# Patient Record
Sex: FEMALE | Race: BLACK OR AFRICAN AMERICAN | NOT HISPANIC OR LATINO | Employment: FULL TIME | URBAN - METROPOLITAN AREA
[De-identification: names, ages, dates, MRNs, and addresses within clinical notes are randomized per-mention and may not be internally consistent; named-entity substitution may affect disease eponyms.]

---

## 2019-12-03 ENCOUNTER — HOSPITAL ENCOUNTER (EMERGENCY)
Facility: HOSPITAL | Age: 29
Discharge: HOME | End: 2019-12-03
Attending: EMERGENCY MEDICINE | Admitting: EMERGENCY MEDICINE

## 2019-12-03 VITALS
RESPIRATION RATE: 20 BRPM | SYSTOLIC BLOOD PRESSURE: 100 MMHG | TEMPERATURE: 97.2 F | OXYGEN SATURATION: 100 % | HEART RATE: 81 BPM | DIASTOLIC BLOOD PRESSURE: 69 MMHG | HEIGHT: 67 IN

## 2019-12-03 DIAGNOSIS — N93.8 DYSFUNCTIONAL UTERINE BLEEDING: Primary | ICD-10-CM

## 2019-12-03 LAB
ANION GAP SERPL CALCULATED.3IONS-SCNC: 8.3 MMOL/L (ref 5–15)
BASOPHILS # BLD AUTO: 0.02 10*3/MM3 (ref 0–0.2)
BASOPHILS NFR BLD AUTO: 0.3 % (ref 0–1.5)
BILIRUB UR QL STRIP: NEGATIVE
BUN BLD-MCNC: 8 MG/DL (ref 6–20)
BUN/CREAT SERPL: 13.1 (ref 7–25)
CALCIUM SPEC-SCNC: 9.1 MG/DL (ref 8.6–10.5)
CHLORIDE SERPL-SCNC: 103 MMOL/L (ref 98–107)
CLARITY UR: CLEAR
CO2 SERPL-SCNC: 28.7 MMOL/L (ref 22–29)
COLOR UR: YELLOW
CREAT BLD-MCNC: 0.61 MG/DL (ref 0.57–1)
DEPRECATED RDW RBC AUTO: 37.6 FL (ref 37–54)
EOSINOPHIL # BLD AUTO: 0.11 10*3/MM3 (ref 0–0.4)
EOSINOPHIL NFR BLD AUTO: 1.8 % (ref 0.3–6.2)
ERYTHROCYTE [DISTWIDTH] IN BLOOD BY AUTOMATED COUNT: 12.4 % (ref 12.3–15.4)
GFR SERPL CREATININE-BSD FRML MDRD: 142 ML/MIN/1.73
GLUCOSE BLD-MCNC: 74 MG/DL (ref 65–99)
GLUCOSE UR STRIP-MCNC: NEGATIVE MG/DL
HCG SERPL QL: NEGATIVE
HCT VFR BLD AUTO: 37 % (ref 34–46.6)
HGB BLD-MCNC: 12.7 G/DL (ref 12–15.9)
HGB UR QL STRIP.AUTO: NEGATIVE
IMM GRANULOCYTES # BLD AUTO: 0.01 10*3/MM3 (ref 0–0.05)
IMM GRANULOCYTES NFR BLD AUTO: 0.2 % (ref 0–0.5)
INR PPP: 1.04 (ref 0.9–1.1)
KETONES UR QL STRIP: NEGATIVE
LEUKOCYTE ESTERASE UR QL STRIP.AUTO: NEGATIVE
LYMPHOCYTES # BLD AUTO: 3 10*3/MM3 (ref 0.7–3.1)
LYMPHOCYTES NFR BLD AUTO: 48.5 % (ref 19.6–45.3)
MCH RBC QN AUTO: 28.8 PG (ref 26.6–33)
MCHC RBC AUTO-ENTMCNC: 34.3 G/DL (ref 31.5–35.7)
MCV RBC AUTO: 83.9 FL (ref 79–97)
MONOCYTES # BLD AUTO: 0.53 10*3/MM3 (ref 0.1–0.9)
MONOCYTES NFR BLD AUTO: 8.6 % (ref 5–12)
NEUTROPHILS # BLD AUTO: 2.51 10*3/MM3 (ref 1.7–7)
NEUTROPHILS NFR BLD AUTO: 40.6 % (ref 42.7–76)
NITRITE UR QL STRIP: NEGATIVE
NRBC BLD AUTO-RTO: 0 /100 WBC (ref 0–0.2)
PH UR STRIP.AUTO: 7.5 [PH] (ref 5–8)
PLATELET # BLD AUTO: 356 10*3/MM3 (ref 140–450)
PMV BLD AUTO: 9.4 FL (ref 6–12)
POTASSIUM BLD-SCNC: 4.4 MMOL/L (ref 3.5–5.2)
PROT UR QL STRIP: NEGATIVE
PROTHROMBIN TIME: 13.3 SECONDS (ref 11.7–14.2)
RBC # BLD AUTO: 4.41 10*6/MM3 (ref 3.77–5.28)
SODIUM BLD-SCNC: 140 MMOL/L (ref 136–145)
SP GR UR STRIP: 1.01 (ref 1–1.03)
UROBILINOGEN UR QL STRIP: NORMAL
WBC NRBC COR # BLD: 6.18 10*3/MM3 (ref 3.4–10.8)

## 2019-12-03 PROCEDURE — 80048 BASIC METABOLIC PNL TOTAL CA: CPT | Performed by: PHYSICIAN ASSISTANT

## 2019-12-03 PROCEDURE — 99283 EMERGENCY DEPT VISIT LOW MDM: CPT

## 2019-12-03 PROCEDURE — 84703 CHORIONIC GONADOTROPIN ASSAY: CPT | Performed by: PHYSICIAN ASSISTANT

## 2019-12-03 PROCEDURE — 85025 COMPLETE CBC W/AUTO DIFF WBC: CPT | Performed by: PHYSICIAN ASSISTANT

## 2019-12-03 PROCEDURE — 85610 PROTHROMBIN TIME: CPT | Performed by: PHYSICIAN ASSISTANT

## 2019-12-03 PROCEDURE — 81003 URINALYSIS AUTO W/O SCOPE: CPT | Performed by: PHYSICIAN ASSISTANT

## 2019-12-03 RX ORDER — IBUPROFEN 800 MG/1
800 TABLET ORAL EVERY 8 HOURS PRN
Qty: 30 TABLET | Refills: 0 | Status: SHIPPED | OUTPATIENT
Start: 2019-12-03 | End: 2020-02-06

## 2019-12-03 NOTE — DISCHARGE INSTRUCTIONS
Stay well-hydrated.  Recheck with your OB/GYN as scheduled or sooner if available.  Return to the ER for bleeding greater than 1 pad per hour for more than 6 hours, severe pain, fever >100.4, passing out, intractable vomiting, any concerns.

## 2019-12-03 NOTE — ED PROVIDER NOTES
EMERGENCY DEPARTMENT ENCOUNTER    Room Number:  19/19  Date of encounter:  12/3/2019  PCP: Provider, No Known  Historian: Patient, Spouse      HPI:  Chief Complaint: Vaginal Bleeding  A complete HPI/ROS/PMH/PSH/SH/FH are unobtainable due to: none  Context: Kristen Mota is a 29 y.o. female who presents to the ED c/o vaginal bleeding, off and on for the last two weeks. Pt states that she has been having irregular menstrual cycles for at least the past 4 months. She reports some occasional dysuria. Pt also reports some lower abd pain and lower back pain. No N/V/D.       MEDICAL HISTORY REVIEW    No pertinent previous records    PAST MEDICAL HISTORY  Active Ambulatory Problems     Diagnosis Date Noted   • No Active Ambulatory Problems     Resolved Ambulatory Problems     Diagnosis Date Noted   • No Resolved Ambulatory Problems     No Additional Past Medical History         PAST SURGICAL HISTORY  No past surgical history on file.      FAMILY HISTORY  No family history on file.      SOCIAL HISTORY  Social History     Socioeconomic History   • Marital status:      Spouse name: Not on file   • Number of children: Not on file   • Years of education: Not on file   • Highest education level: Not on file         ALLERGIES  Patient has no known allergies.        REVIEW OF SYSTEMS  Review of Systems   Constitutional: Negative for fever.   HENT: Negative for sore throat.    Eyes: Negative.    Respiratory: Negative for cough and shortness of breath.    Cardiovascular: Negative for chest pain.   Gastrointestinal: Positive for abdominal pain. Negative for diarrhea and vomiting.   Genitourinary: Positive for menstrual problem and vaginal bleeding. Negative for dysuria.   Musculoskeletal: Positive for back pain. Negative for neck pain.   Skin: Negative for rash.   Allergic/Immunologic: Negative.    Neurological: Negative for weakness, numbness and headaches.   Hematological: Negative.    Psychiatric/Behavioral: Negative.     All other systems reviewed and are negative.       All systems reviewed and negative except for those discussed in HPI.       PHYSICAL EXAM    I have reviewed the triage vital signs and nursing notes.    ED Triage Vitals [12/03/19 0406]   Temp Heart Rate Resp BP SpO2   97.2 °F (36.2 °C) 81 20 -- 100 %      Temp src Heart Rate Source Patient Position BP Location FiO2 (%)   Tympanic Monitor -- -- --       GENERAL: not distressed  HENT: nares patent  EYES: no scleral icterus  CV: regular rhythm, regular rate  RESPIRATORY: normal effort, CTAB  ABDOMEN: soft with mild suprapubic tenderness. No guarding or rebound. No CVA tenderness  MUSCULOSKELETAL: no deformity  NEURO: alert, moves all extremities, follows commands  SKIN: warm, dry        LAB RESULTS  Recent Results (from the past 24 hour(s))   Protime-INR    Collection Time: 12/03/19  5:13 AM   Result Value Ref Range    Protime 13.3 11.7 - 14.2 Seconds    INR 1.04 0.90 - 1.10   CBC Auto Differential    Collection Time: 12/03/19  5:13 AM   Result Value Ref Range    WBC 6.18 3.40 - 10.80 10*3/mm3    RBC 4.41 3.77 - 5.28 10*6/mm3    Hemoglobin 12.7 12.0 - 15.9 g/dL    Hematocrit 37.0 34.0 - 46.6 %    MCV 83.9 79.0 - 97.0 fL    MCH 28.8 26.6 - 33.0 pg    MCHC 34.3 31.5 - 35.7 g/dL    RDW 12.4 12.3 - 15.4 %    RDW-SD 37.6 37.0 - 54.0 fl    MPV 9.4 6.0 - 12.0 fL    Platelets 356 140 - 450 10*3/mm3    Neutrophil % 40.6 (L) 42.7 - 76.0 %    Lymphocyte % 48.5 (H) 19.6 - 45.3 %    Monocyte % 8.6 5.0 - 12.0 %    Eosinophil % 1.8 0.3 - 6.2 %    Basophil % 0.3 0.0 - 1.5 %    Immature Grans % 0.2 0.0 - 0.5 %    Neutrophils, Absolute 2.51 1.70 - 7.00 10*3/mm3    Lymphocytes, Absolute 3.00 0.70 - 3.10 10*3/mm3    Monocytes, Absolute 0.53 0.10 - 0.90 10*3/mm3    Eosinophils, Absolute 0.11 0.00 - 0.40 10*3/mm3    Basophils, Absolute 0.02 0.00 - 0.20 10*3/mm3    Immature Grans, Absolute 0.01 0.00 - 0.05 10*3/mm3    nRBC 0.0 0.0 - 0.2 /100 WBC       Ordered the above labs and  independently reviewed the results.        RADIOLOGY  No Radiology Exams Resulted Within Past 24 Hours    I ordered the above noted radiological studies. Reviewed by me and discussed with radiologist.  See dictation for official radiology interpretation.      PROCEDURES    Procedures      MEDICATIONS GIVEN IN ER    Medications - No data to display      PROGRESS, DATA ANALYSIS, CONSULTS, AND MEDICAL DECISION MAKING    4:35 AM  Ordered blood work, UA    6:00 AM  Patient care discussed with and turned over to LEONARD Arango, pending lab results.      Reviewed pt's history and workup with Dr. Cowart.  After a bedside evaluation; Dr Cowart agrees with the plan of care      All labs have been independently reviewed by me.  All radiology studies have been reviewed by me and discussed with radiologist dictating the report.   EKG's independently viewed and interpreted by me.  Discussion below represents my analysis of pertinent findings related to patient's condition, differential diagnosis, treatment plan and final disposition.           AS OF 5:42 AM VITALS:    BP - 119/94  HR - 81  TEMP - 97.2 °F (36.2 °C) (Tympanic)  O2 SATS - 100%        DIAGNOSIS  Final diagnoses:   Dysfunctional uterine bleeding         DISPOSITION  DISCHARGE    Patient discharged in stable condition.    Reviewed implications of results, diagnosis, meds, responsibility to follow up, warning signs and symptoms of possible worsening, potential complications and reasons to return to ER.    Patient/Family voiced understanding of above instructions.    Discussed plan for discharge, as there is no emergent indication for admission. Patient referred to primary care provider for BP management due to today's BP. Pt/family is agreeable and understands need for follow up and repeat testing.  Pt is aware that discharge does not mean that nothing is wrong but it indicates no emergency is present that requires admission and they must continue care with follow-up as given  below or physician of their choice.     FOLLOW-UP  No follow-up provider specified.       Medication List      No changes were made to your prescriptions during this visit.               Documentation assistance provided by carlo Dominguez for Antonio Christine PA-C .  Information recorded by the carlo was done at my direction and has been verified and validated by me.       Leonard Dominguez  12/03/19 0456       Antonio Christine PA  12/03/19 0603

## 2019-12-03 NOTE — ED PROVIDER NOTES
EMERGENCY DEPARTMENT ENCOUNTER    Room number:  19/19  Date Seen:  12/3/2019  Time of transfer:0600  PCP:  Provider, No Known    Laboratory Results:  Recent Results (from the past 24 hour(s))   Basic Metabolic Panel    Collection Time: 12/03/19  5:13 AM   Result Value Ref Range    Glucose 74 65 - 99 mg/dL    BUN 8 6 - 20 mg/dL    Creatinine 0.61 0.57 - 1.00 mg/dL    Sodium 140 136 - 145 mmol/L    Potassium 4.4 3.5 - 5.2 mmol/L    Chloride 103 98 - 107 mmol/L    CO2 28.7 22.0 - 29.0 mmol/L    Calcium 9.1 8.6 - 10.5 mg/dL    eGFR  African Amer 142 >60 mL/min/1.73    BUN/Creatinine Ratio 13.1 7.0 - 25.0    Anion Gap 8.3 5.0 - 15.0 mmol/L   Protime-INR    Collection Time: 12/03/19  5:13 AM   Result Value Ref Range    Protime 13.3 11.7 - 14.2 Seconds    INR 1.04 0.90 - 1.10   hCG, Serum, Qualitative    Collection Time: 12/03/19  5:13 AM   Result Value Ref Range    HCG Qualitative Negative Negative   CBC Auto Differential    Collection Time: 12/03/19  5:13 AM   Result Value Ref Range    WBC 6.18 3.40 - 10.80 10*3/mm3    RBC 4.41 3.77 - 5.28 10*6/mm3    Hemoglobin 12.7 12.0 - 15.9 g/dL    Hematocrit 37.0 34.0 - 46.6 %    MCV 83.9 79.0 - 97.0 fL    MCH 28.8 26.6 - 33.0 pg    MCHC 34.3 31.5 - 35.7 g/dL    RDW 12.4 12.3 - 15.4 %    RDW-SD 37.6 37.0 - 54.0 fl    MPV 9.4 6.0 - 12.0 fL    Platelets 356 140 - 450 10*3/mm3    Neutrophil % 40.6 (L) 42.7 - 76.0 %    Lymphocyte % 48.5 (H) 19.6 - 45.3 %    Monocyte % 8.6 5.0 - 12.0 %    Eosinophil % 1.8 0.3 - 6.2 %    Basophil % 0.3 0.0 - 1.5 %    Immature Grans % 0.2 0.0 - 0.5 %    Neutrophils, Absolute 2.51 1.70 - 7.00 10*3/mm3    Lymphocytes, Absolute 3.00 0.70 - 3.10 10*3/mm3    Monocytes, Absolute 0.53 0.10 - 0.90 10*3/mm3    Eosinophils, Absolute 0.11 0.00 - 0.40 10*3/mm3    Basophils, Absolute 0.02 0.00 - 0.20 10*3/mm3    Immature Grans, Absolute 0.01 0.00 - 0.05 10*3/mm3    nRBC 0.0 0.0 - 0.2 /100 WBC   Urinalysis With Microscopic If Indicated (No Culture) - Urine, Clean Catch     Collection Time: 12/03/19  6:06 AM   Result Value Ref Range    Color, UA Yellow Yellow, Straw    Appearance, UA Clear Clear    pH, UA 7.5 5.0 - 8.0    Specific Gravity, UA 1.010 1.005 - 1.030    Glucose, UA Negative Negative    Ketones, UA Negative Negative    Bilirubin, UA Negative Negative    Blood, UA Negative Negative    Protein, UA Negative Negative    Leuk Esterase, UA Negative Negative    Nitrite, UA Negative Negative    Urobilinogen, UA 0.2 E.U./dL 0.2 - 1.0 E.U./dL     I reviewed the above results.      Medications ordered in ED:  Medications - No data to display    Progress and Consult Notes:  0600:  Patient care transferred from LEONARD Mcneill pending workup completion.    0657: Checked pt who is resting comfortably and in NAD. Informed pt of labs, which were normal. Advised pt to follow up with gynecology. Pt reports having appointment in January. Discussed plan to discharge and prescribe ibuprofen (800 mg) for pain. RTER pre-cautions given. Pt understands and agrees with the plan, all questions answered.       Diagnosis:  Final diagnoses:   Dysfunctional uterine bleeding       Follow Up:  Swati Montes De Oca MD  [address] [address]  [phone]      on January 29th as scheduled or sooner      RX:     Medication List      New Prescriptions    ibuprofen 800 MG tablet  Commonly known as:  ADVIL,MOTRIN  Take 1 tablet by mouth Every 8 (Eight) Hours As Needed for Mild Pain    (pain. take with food.).          Provider attestation:  I personally reviewed the past medical history, past surgical history, social history, family history, current medications, and allergies as they appear in the chart.    The patient was seen and examined by myself and , who agrees with plan.          Carmen Vee  12/03/19 0701       Ashleigh Arango PA  12/03/19 0715

## 2019-12-03 NOTE — ED PROVIDER NOTES
MD ATTESTATION NOTE    The HAL and I have discussed this patient's history, physical exam, and treatment plan.  I have reviewed the documentation and personally had a face to face interaction with the patient. I affirm the documentation and agree with the treatment and plan.  The attached note describes my personal findings.    Patient with dysfunctional uterine bleeding for about 4 months now.  She has not been able see an OB as of yet, and she is not pregnant as far she knows.  The periods are intermittent, short at times and unexpected at other times along with some cramping.    Her evaluation here is unremarkable with a normal hemoglobin, normal white count, not pregnant and normal physical exam.  I have advised them to try and see if there are any cancellations with the OB/GYN to see if they can provide any other systems.     Peng Cowart MD  12/03/19 0611

## 2019-12-03 NOTE — ED TRIAGE NOTES
"Pt here vaginal bleeding for 2 weeks currently on menstrual cycle now. Pt didn't have period for 1 month then started 2 weeks ago and has not stopped bleeding. Complains of low abdomen pain with lower back pain at this time rating pain 5. Pt states\" that sometimes period will start one day then stop the next day, or not have periods for weeks at a time then start suddenly\"  Pt has not seen GYN     "

## 2019-12-15 ENCOUNTER — APPOINTMENT (OUTPATIENT)
Dept: ULTRASOUND IMAGING | Facility: HOSPITAL | Age: 29
End: 2019-12-15

## 2019-12-15 ENCOUNTER — HOSPITAL ENCOUNTER (EMERGENCY)
Facility: HOSPITAL | Age: 29
Discharge: HOME | End: 2019-12-15
Attending: EMERGENCY MEDICINE | Admitting: EMERGENCY MEDICINE

## 2019-12-15 VITALS
BODY MASS INDEX: 27.51 KG/M2 | DIASTOLIC BLOOD PRESSURE: 86 MMHG | TEMPERATURE: 98.2 F | WEIGHT: 165.1 LBS | HEART RATE: 72 BPM | HEIGHT: 65 IN | RESPIRATION RATE: 17 BRPM | SYSTOLIC BLOOD PRESSURE: 115 MMHG | OXYGEN SATURATION: 99 %

## 2019-12-15 DIAGNOSIS — N93.8 DYSFUNCTIONAL UTERINE BLEEDING: Primary | ICD-10-CM

## 2019-12-15 LAB
ALBUMIN SERPL-MCNC: 3.9 G/DL (ref 3.5–5.2)
ALBUMIN/GLOB SERPL: 1 G/DL
ALP SERPL-CCNC: 81 U/L (ref 39–117)
ALT SERPL W P-5'-P-CCNC: 11 U/L (ref 1–33)
ANION GAP SERPL CALCULATED.3IONS-SCNC: 9.8 MMOL/L (ref 5–15)
AST SERPL-CCNC: 17 U/L (ref 1–32)
BASOPHILS # BLD AUTO: 0.01 10*3/MM3 (ref 0–0.2)
BASOPHILS NFR BLD AUTO: 0.2 % (ref 0–1.5)
BILIRUB SERPL-MCNC: 0.5 MG/DL (ref 0.2–1.2)
BUN BLD-MCNC: 9 MG/DL (ref 6–20)
BUN/CREAT SERPL: 18.8 (ref 7–25)
CALCIUM SPEC-SCNC: 9 MG/DL (ref 8.6–10.5)
CHLORIDE SERPL-SCNC: 104 MMOL/L (ref 98–107)
CO2 SERPL-SCNC: 26.2 MMOL/L (ref 22–29)
CREAT BLD-MCNC: 0.48 MG/DL (ref 0.57–1)
DEPRECATED RDW RBC AUTO: 39.3 FL (ref 37–54)
EOSINOPHIL # BLD AUTO: 0.09 10*3/MM3 (ref 0–0.4)
EOSINOPHIL NFR BLD AUTO: 1.7 % (ref 0.3–6.2)
ERYTHROCYTE [DISTWIDTH] IN BLOOD BY AUTOMATED COUNT: 12.7 % (ref 12.3–15.4)
GFR SERPL CREATININE-BSD FRML MDRD: >150 ML/MIN/1.73
GLOBULIN UR ELPH-MCNC: 3.9 GM/DL
GLUCOSE BLD-MCNC: 89 MG/DL (ref 65–99)
HCG SERPL QL: NEGATIVE
HCT VFR BLD AUTO: 40.3 % (ref 34–46.6)
HGB BLD-MCNC: 13.7 G/DL (ref 12–15.9)
IMM GRANULOCYTES # BLD AUTO: 0.01 10*3/MM3 (ref 0–0.05)
IMM GRANULOCYTES NFR BLD AUTO: 0.2 % (ref 0–0.5)
LYMPHOCYTES # BLD AUTO: 2.19 10*3/MM3 (ref 0.7–3.1)
LYMPHOCYTES NFR BLD AUTO: 40.3 % (ref 19.6–45.3)
MCH RBC QN AUTO: 28.9 PG (ref 26.6–33)
MCHC RBC AUTO-ENTMCNC: 34 G/DL (ref 31.5–35.7)
MCV RBC AUTO: 85 FL (ref 79–97)
MONOCYTES # BLD AUTO: 0.46 10*3/MM3 (ref 0.1–0.9)
MONOCYTES NFR BLD AUTO: 8.5 % (ref 5–12)
NEUTROPHILS # BLD AUTO: 2.68 10*3/MM3 (ref 1.7–7)
NEUTROPHILS NFR BLD AUTO: 49.1 % (ref 42.7–76)
NRBC BLD AUTO-RTO: 0 /100 WBC (ref 0–0.2)
PLATELET # BLD AUTO: 353 10*3/MM3 (ref 140–450)
PMV BLD AUTO: 9.6 FL (ref 6–12)
POTASSIUM BLD-SCNC: 4.4 MMOL/L (ref 3.5–5.2)
PROT SERPL-MCNC: 7.8 G/DL (ref 6–8.5)
RBC # BLD AUTO: 4.74 10*6/MM3 (ref 3.77–5.28)
SODIUM BLD-SCNC: 140 MMOL/L (ref 136–145)
WBC NRBC COR # BLD: 5.44 10*3/MM3 (ref 3.4–10.8)

## 2019-12-15 PROCEDURE — 84703 CHORIONIC GONADOTROPIN ASSAY: CPT | Performed by: EMERGENCY MEDICINE

## 2019-12-15 PROCEDURE — 76830 TRANSVAGINAL US NON-OB: CPT

## 2019-12-15 PROCEDURE — 99283 EMERGENCY DEPT VISIT LOW MDM: CPT

## 2019-12-15 PROCEDURE — 93976 VASCULAR STUDY: CPT

## 2019-12-15 PROCEDURE — 85025 COMPLETE CBC W/AUTO DIFF WBC: CPT | Performed by: EMERGENCY MEDICINE

## 2019-12-15 PROCEDURE — 76856 US EXAM PELVIC COMPLETE: CPT

## 2019-12-15 PROCEDURE — 80053 COMPREHEN METABOLIC PANEL: CPT | Performed by: EMERGENCY MEDICINE

## 2019-12-15 RX ORDER — SODIUM CHLORIDE 0.9 % (FLUSH) 0.9 %
10 SYRINGE (ML) INJECTION AS NEEDED
Status: DISCONTINUED | OUTPATIENT
Start: 2019-12-15 | End: 2019-12-16 | Stop reason: HOSPADM

## 2019-12-16 NOTE — ED PROVIDER NOTES
EMERGENCY DEPARTMENT ENCOUNTER    Room Number:  31/31  Date of encounter:  12/16/2019  PCP: Provider, No Known  Historian: Patient      HPI:  Chief Complaint: Vaginal bleeding  A complete HPI/ROS/PMH/PSH/SH/FH are unobtainable due to: Nothing    Context: Kristen Mota is a 29 y.o. female who presents to the ED c/o ongoing vaginal bleeding and cramping for a month now.  Patient states the bleeding and cramping are constant, moderate in severity, pain is nonradiating and nothing makes it better or worse.    Patient was seen here 2 weeks ago for similar complaints, and was advised to follow-up with OB/GYN but she is not yet been able to get a timely appointment.  Nothing has changed about her symptoms from 2 weeks ago it just continues.    She quantifies the bleeding as soaking 2-3 pads per day.      PAST MEDICAL HISTORY  Active Ambulatory Problems     Diagnosis Date Noted   • No Active Ambulatory Problems     Resolved Ambulatory Problems     Diagnosis Date Noted   • No Resolved Ambulatory Problems     No Additional Past Medical History         PAST SURGICAL HISTORY  History reviewed. No pertinent surgical history.      FAMILY HISTORY  History reviewed. No pertinent family history.      SOCIAL HISTORY  Social History     Socioeconomic History   • Marital status:      Spouse name: Not on file   • Number of children: Not on file   • Years of education: Not on file   • Highest education level: Not on file   Tobacco Use   • Smoking status: Never Smoker   Substance and Sexual Activity   • Alcohol use: Never     Frequency: Never         ALLERGIES  Patient has no known allergies.        REVIEW OF SYSTEMS  Review of Systems     All systems reviewed and negative except for those discussed in HPI.       PHYSICAL EXAM    I have reviewed the triage vital signs and nursing notes.    ED Triage Vitals [12/15/19 2110]   Temp Heart Rate Resp BP SpO2   97 °F (36.1 °C) 89 16 -- 99 %      Temp src Heart Rate Source Patient  Position BP Location FiO2 (%)   -- -- -- -- --       Physical Exam  GENERAL: not distressed  HENT: nares patent  EYES: no scleral icterus  CV: regular rhythm, regular rate  RESPIRATORY: normal effort  ABDOMEN: soft, nontender palpation, bowel sounds present  MUSCULOSKELETAL: no deformity  NEURO: alert, moves all extremities, follows commands  SKIN: warm, dry        LAB RESULTS  Recent Results (from the past 24 hour(s))   Comprehensive Metabolic Panel    Collection Time: 12/15/19  9:56 PM   Result Value Ref Range    Glucose 89 65 - 99 mg/dL    BUN 9 6 - 20 mg/dL    Creatinine 0.48 (L) 0.57 - 1.00 mg/dL    Sodium 140 136 - 145 mmol/L    Potassium 4.4 3.5 - 5.2 mmol/L    Chloride 104 98 - 107 mmol/L    CO2 26.2 22.0 - 29.0 mmol/L    Calcium 9.0 8.6 - 10.5 mg/dL    Total Protein 7.8 6.0 - 8.5 g/dL    Albumin 3.90 3.50 - 5.20 g/dL    ALT (SGPT) 11 1 - 33 U/L    AST (SGOT) 17 1 - 32 U/L    Alkaline Phosphatase 81 39 - 117 U/L    Total Bilirubin 0.5 0.2 - 1.2 mg/dL    eGFR  African Amer >150 >60 mL/min/1.73    Globulin 3.9 gm/dL    A/G Ratio 1.0 g/dL    BUN/Creatinine Ratio 18.8 7.0 - 25.0    Anion Gap 9.8 5.0 - 15.0 mmol/L   hCG, Serum, Qualitative    Collection Time: 12/15/19  9:56 PM   Result Value Ref Range    HCG Qualitative Negative Negative   CBC Auto Differential    Collection Time: 12/15/19  9:56 PM   Result Value Ref Range    WBC 5.44 3.40 - 10.80 10*3/mm3    RBC 4.74 3.77 - 5.28 10*6/mm3    Hemoglobin 13.7 12.0 - 15.9 g/dL    Hematocrit 40.3 34.0 - 46.6 %    MCV 85.0 79.0 - 97.0 fL    MCH 28.9 26.6 - 33.0 pg    MCHC 34.0 31.5 - 35.7 g/dL    RDW 12.7 12.3 - 15.4 %    RDW-SD 39.3 37.0 - 54.0 fl    MPV 9.6 6.0 - 12.0 fL    Platelets 353 140 - 450 10*3/mm3    Neutrophil % 49.1 42.7 - 76.0 %    Lymphocyte % 40.3 19.6 - 45.3 %    Monocyte % 8.5 5.0 - 12.0 %    Eosinophil % 1.7 0.3 - 6.2 %    Basophil % 0.2 0.0 - 1.5 %    Immature Grans % 0.2 0.0 - 0.5 %    Neutrophils, Absolute 2.68 1.70 - 7.00 10*3/mm3     Lymphocytes, Absolute 2.19 0.70 - 3.10 10*3/mm3    Monocytes, Absolute 0.46 0.10 - 0.90 10*3/mm3    Eosinophils, Absolute 0.09 0.00 - 0.40 10*3/mm3    Basophils, Absolute 0.01 0.00 - 0.20 10*3/mm3    Immature Grans, Absolute 0.01 0.00 - 0.05 10*3/mm3    nRBC 0.0 0.0 - 0.2 /100 WBC       Ordered the above labs and independently reviewed the results.        RADIOLOGY  Us Pelvis Complete    Result Date: 12/15/2019  PELVIC ULTRASOUND  HISTORY: Menorrhagia  COMPARISON: None available.  TECHNIQUE: Gray scale, color Doppler, and spectral Doppler waveform analysis was performed through the pelvis both transabdominally and transvaginally. The  FINDINGS: The uterus measures 8.0 x 3.6 x 4.9 cm. There are some cystic structures in fluid seen within the lower uterine segment and cervix.. The patient's endometrium is thickened. It measures up to 1.6 cm. There are some areas of fluid identified within it in the lower uterine segment and, and also within the cervical canal. There is a small amount of free fluid seen within the cul-de-sac. The right ovary measures 3.0 x 2.5 x 3.5 cm. The left ovary measures 4.0 x 2.7 x 2.2 cm. Normal color Doppler flow is identified within both ovaries. Normal-appearing follicles are seen on the left ovary.      Endometrial thickening of uncertain clinical significance. It is relatively homogeneous in appearance, which would argue against adenomyosis, although the patient does have some cystic structures seen within the lower uterine segment. Subendometrial cysts are not completely excluded. Another consideration would be endometrial hyperplasia. Gynecologic referral is recommended. MRI could be considered for additional evaluation, if symptoms persist.  This report was finalized on 12/15/2019 11:27 PM by Dr. Niru Ahuja M.D.        I ordered the above noted radiological studies. Reviewed by me and discussed with radiologist.  See dictation for official radiology  interpretation.      PROCEDURES    Procedures      MEDICATIONS GIVEN IN ER    Medications - No data to display      PROGRESS, DATA ANALYSIS, CONSULTS, AND MEDICAL DECISION MAKING    All labs have been independently reviewed by me.  All radiology studies have been reviewed by me and discussed with radiologist dictating the report.   EKG's independently viewed and interpreted by me.  Discussion below represents my analysis of pertinent findings related to patient's condition, differential diagnosis, treatment plan and final disposition.        ED Course as of Dec 16 0227   Mon Dec 16, 2019   0226 CBC shows a hemoglobin of 13.7, chemistry negative and beta hCG negative    [DP]   0226 Pelvic ultrasound shows a thickened endometrial stripe with some complex fluid in the lower uterine segment which is consistent with normal menses    [DP]   0226 I have again spoken with the patient and  about the need for OB/GYN follow-up.  They ask about medications to stop the bleeding, but I told him I felt at this point that hormonal manipulation would not be appropriate without getting OB/GYN consultation in the outpatient setting first.    [DP]      ED Course User Index  [DP] Peng Cowart MD           AS OF 2:27 AM VITALS:    BP - 115/86  HR - 72  TEMP - 98.2 °F (36.8 °C) (Oral)  O2 SATS - 99%        DIAGNOSIS  Final diagnoses:   Dysfunctional uterine bleeding         DISPOSITION  Discharge           Peng Cowart MD  12/16/19 0227

## 2019-12-16 NOTE — ED NOTES
Pt reports she is having a period problem and is bleeding a lot. Here 2 weeks ago for the same. Reports saturating 3 pads a day. This began in November. Has not seen GYN. C/o mid abd pain.      Yenny Edmond, RN  12/15/19 9079

## 2020-01-29 ENCOUNTER — OFFICE VISIT (OUTPATIENT)
Dept: OBSTETRICS AND GYNECOLOGY | Facility: CLINIC | Age: 30
End: 2020-01-29

## 2020-01-29 VITALS
HEART RATE: 97 BPM | HEIGHT: 65 IN | BODY MASS INDEX: 26.49 KG/M2 | WEIGHT: 159 LBS | DIASTOLIC BLOOD PRESSURE: 83 MMHG | SYSTOLIC BLOOD PRESSURE: 120 MMHG

## 2020-01-29 DIAGNOSIS — N93.9 ABNORMAL UTERINE BLEEDING (AUB): ICD-10-CM

## 2020-01-29 DIAGNOSIS — Z01.419 WELL WOMAN EXAM WITH ROUTINE GYNECOLOGICAL EXAM: Primary | ICD-10-CM

## 2020-01-29 LAB
B-HCG UR QL: NEGATIVE
INTERNAL NEGATIVE CONTROL: NEGATIVE
INTERNAL POSITIVE CONTROL: POSITIVE
Lab: NORMAL

## 2020-01-29 PROCEDURE — 99385 PREV VISIT NEW AGE 18-39: CPT | Performed by: OBSTETRICS & GYNECOLOGY

## 2020-01-29 PROCEDURE — 81025 URINE PREGNANCY TEST: CPT | Performed by: OBSTETRICS & GYNECOLOGY

## 2020-01-29 RX ORDER — MEDROXYPROGESTERONE ACETATE 10 MG/1
10 TABLET ORAL DAILY
Qty: 14 TABLET | Refills: 0 | Status: SHIPPED | OUTPATIENT
Start: 2020-01-29 | End: 2020-02-06 | Stop reason: SDUPTHER

## 2020-01-29 NOTE — PROGRESS NOTES
Chief Complaint   Patient presents with   • Vaginal Bleeding     pt reports she has been having vaginal bleeding since 20        Kristen Mota is a 30 y.o.  who presents for an annual examination.  Int ID [id number], Adriana; accompanied by her  and gives me permission for discussion in front of him.    AUB:  Bleeding started 20.  She has been bleeding most days (sometimes has one day of no bleeding). Has not had bleeding like this before.  2019 was her last period before that. Since this bleeding has started she has had a lot of pain in her stomach. Prior to 2019, every 1-2 months would have a period.  On her heaviest day in the last month, she has had to change feminine hygiene products 3 to 4 times per day.  She reports passage of clots - has a picture that shows a dark clot about the size of a walnut. Menarche: 19 yo. Was seen in ED for this. Had an ultrasound that showed cystic structures and fluid in the lower uterine segment.    Pap history:  Last pap: unsure  Prior abnormal paps: no  STDs  Sexually active: yes  History of STDs: no  Contraception:  None    Screening for BRCA-   Is patient's family history significant for BRCA risk factors? no    History reviewed. No pertinent past medical history.  History reviewed. No pertinent surgical history.  OB History    Para Term  AB Living   0 0 0 0 0 0   SAB TAB Ectopic Molar Multiple Live Births   0 0 0 0 0 0      Social History     Tobacco Use   • Smoking status: Never Smoker   • Smokeless tobacco: Never Used   Substance Use Topics   • Alcohol use: Never     Frequency: Never   • Drug use: Never     Family History   Problem Relation Age of Onset   • Breast cancer Neg Hx    • Ovarian cancer Neg Hx    • Uterine cancer Neg Hx    • Colon cancer Neg Hx    • Deep vein thrombosis Neg Hx    • Pulmonary embolism Neg Hx      Current Outpatient Medications on File Prior to Visit   Medication Sig Dispense Refill   •  "ibuprofen (ADVIL,MOTRIN) 800 MG tablet Take 1 tablet by mouth Every 8 (Eight) Hours As Needed for Mild Pain  (pain. take with food.). 30 tablet 0     No current facility-administered medications on file prior to visit.      No Known Allergies     Review of Systems   Constitutional: Negative for activity change, appetite change, fatigue, fever and unexpected weight change.   Gastrointestinal: Negative for abdominal pain, nausea and vomiting.   Genitourinary: Positive for menstrual problem and vaginal bleeding. Negative for vaginal discharge and vaginal pain.   Hematological: Does not bruise/bleed easily.   Psychiatric/Behavioral: Negative for agitation.         OBJECTIVE:   Vitals:    01/29/20 1324   BP: 120/83   Pulse: 97   Weight: 72.1 kg (159 lb)   Height: 165.1 cm (65\")      Physical Exam   Constitutional: She is oriented to person, place, and time. She appears well-developed and well-nourished. No distress.   HENT:   Head: Normocephalic and atraumatic.   Eyes: EOM are normal. No scleral icterus.   Neck: Normal range of motion. Neck supple. No thyromegaly present.   Cardiovascular: Normal rate and regular rhythm. Exam reveals no gallop and no friction rub.   No murmur heard.  Pulmonary/Chest: Effort normal and breath sounds normal. No respiratory distress. She has no wheezes. She has no rales. She exhibits no tenderness. Right breast exhibits no inverted nipple. Left breast exhibits no inverted nipple. No breast swelling, tenderness, discharge or bleeding. Breasts are symmetrical.   Abdominal: Soft. Bowel sounds are normal. She exhibits no distension. There is no tenderness. There is no guarding.   Genitourinary: Vagina normal and uterus normal. There is no rash, tenderness, lesion, injury or Bartholin's cyst on the right labia. There is no rash, tenderness, lesion, injury or Bartholin's cyst on the left labia. Uterus is not mobile.   Cervix is not parous. Cervix does not exhibit motion tenderness, discharge, " friability, lesion, polyp, nabothian cyst, eversion, pinkness or cyanosis. Right adnexum displays no mass, no tenderness and no fullness. Right adnexum is present.Left adnexum displays no mass, no tenderness and no fullness. Left adnexum is present.No vaginal discharge found.   Musculoskeletal: She exhibits no edema or deformity.   Neurological: She is alert and oriented to person, place, and time.   Skin: Skin is warm and dry. She is not diaphoretic.   Psychiatric: She has a normal mood and affect. Her speech is normal and behavior is normal. Judgment and thought content normal. Cognition and memory are normal.       ASSESSMENT/PLAN:     Annual well woman exam:  Cervical cancer screening:    Denies cervical dysplasia in past   The patient is due for a pap today.    Screening guidelines discussed with patient  Breast cancer screening:    Clinical breast exam recommended for age 20-39 years every 1-3 years   Mammogram recommended starting age 40    Breast self awareness encouraged  STD Screening   Testing declined.    Contraception :   Declines, desires conception    Family history    does not demonstrate need for genetics referral   Healthy lifestyle counseling:   return for routine annual checkups   AUB: labs ordered, recommend return for gyn US and GYN visit. Labs today. Desires conception so we will start with progesterone pill to improve period.      BMI Counseling  Her BMI is classified as BMI 25.0-29.9        Classification: overweight.        Return in about 1 year (around 1/29/2021) for Annual physical.

## 2020-01-30 LAB
ERYTHROCYTE [DISTWIDTH] IN BLOOD BY AUTOMATED COUNT: 12.8 % (ref 11.7–15.4)
HBA1C MFR BLD: 5.1 % (ref 4.8–5.6)
HCT VFR BLD AUTO: 26.9 % (ref 34–46.6)
HGB BLD-MCNC: 9.1 G/DL (ref 11.1–15.9)
MCH RBC QN AUTO: 28.4 PG (ref 26.6–33)
MCHC RBC AUTO-ENTMCNC: 33.8 G/DL (ref 31.5–35.7)
MCV RBC AUTO: 84 FL (ref 79–97)
PLATELET # BLD AUTO: 406 X10E3/UL (ref 150–450)
PROLACTIN SERPL-MCNC: 13.6 NG/ML (ref 4.8–23.3)
RBC # BLD AUTO: 3.2 X10E6/UL (ref 3.77–5.28)
TESTOST SERPL-MCNC: 33 NG/DL (ref 8–48)
TSH SERPL DL<=0.005 MIU/L-ACNC: 1.66 UIU/ML (ref 0.45–4.5)
WBC # BLD AUTO: 6.1 X10E3/UL (ref 3.4–10.8)

## 2020-01-30 RX ORDER — FERROUS SULFATE 325(65) MG
325 TABLET ORAL
Qty: 30 TABLET | Refills: 6 | Status: SHIPPED | OUTPATIENT
Start: 2020-01-30 | End: 2020-10-05

## 2020-02-03 ENCOUNTER — TELEPHONE (OUTPATIENT)
Dept: OBSTETRICS AND GYNECOLOGY | Facility: CLINIC | Age: 30
End: 2020-02-03

## 2020-02-03 LAB
CONV .: NORMAL
CYTOLOGIST CVX/VAG CYTO: NORMAL
CYTOLOGY CVX/VAG DOC CYTO: NORMAL
CYTOLOGY CVX/VAG DOC THIN PREP: NORMAL
DX ICD CODE: NORMAL
HIV 1 & 2 AB SER-IMP: NORMAL
OTHER STN SPEC: NORMAL
STAT OF ADQ CVX/VAG CYTO-IMP: NORMAL

## 2020-02-03 NOTE — TELEPHONE ENCOUNTER
----- Message from Danny Yin MD sent at 1/30/2020  3:15 PM EST -----  Marge, she has some anemia. Most of the rest of her labs are back and normal. She can go over that with Dr. Montes De Oca, but should be taking iron to replace her low iron. I sent in Rx. Thanks, Dr. iYn

## 2020-02-06 ENCOUNTER — PROCEDURE VISIT (OUTPATIENT)
Dept: OBSTETRICS AND GYNECOLOGY | Facility: CLINIC | Age: 30
End: 2020-02-06

## 2020-02-06 ENCOUNTER — OFFICE VISIT (OUTPATIENT)
Dept: OBSTETRICS AND GYNECOLOGY | Facility: CLINIC | Age: 30
End: 2020-02-06

## 2020-02-06 VITALS
BODY MASS INDEX: 26.82 KG/M2 | SYSTOLIC BLOOD PRESSURE: 114 MMHG | DIASTOLIC BLOOD PRESSURE: 76 MMHG | HEIGHT: 65 IN | WEIGHT: 161 LBS | HEART RATE: 85 BPM

## 2020-02-06 DIAGNOSIS — N93.9 ABNORMAL UTERINE BLEEDING (AUB): Primary | ICD-10-CM

## 2020-02-06 DIAGNOSIS — E28.2 PCOS (POLYCYSTIC OVARIAN SYNDROME): ICD-10-CM

## 2020-02-06 PROCEDURE — 76830 TRANSVAGINAL US NON-OB: CPT | Performed by: OBSTETRICS & GYNECOLOGY

## 2020-02-06 PROCEDURE — 58100 BIOPSY OF UTERUS LINING: CPT | Performed by: OBSTETRICS & GYNECOLOGY

## 2020-02-06 PROCEDURE — 81025 URINE PREGNANCY TEST: CPT | Performed by: OBSTETRICS & GYNECOLOGY

## 2020-02-06 PROCEDURE — 99213 OFFICE O/P EST LOW 20 MIN: CPT | Performed by: OBSTETRICS & GYNECOLOGY

## 2020-02-06 RX ORDER — MEDROXYPROGESTERONE ACETATE 10 MG/1
10 TABLET ORAL DAILY
Qty: 30 TABLET | Refills: 1 | Status: SHIPPED | OUTPATIENT
Start: 2020-02-06 | End: 2020-04-08

## 2020-02-06 NOTE — PROGRESS NOTES
Endometrial Biopsy Procedure Note    Pre-operative Diagnosis: AUB, PCOS    Post-operative Diagnosis: Same    Indications: abnormal uterine bleeding    Procedure Details    Urine pregnancy test was done and result was negative.  The risks (including infection, bleeding, pain, and uterine perforation) and benefits of the procedure were explained to the patient and Verbal informed consent was obtained.  Antibiotic prophylaxis against endocarditis was not indicated.     A timeout procedure was performed.  The patient was placed in the dorsal lithotomy position.  Bimanual exam showed the uterus to be in the anteroflexed position.  A Graves' speculum inserted in the vagina, and the cervix prepped with povidone iodine.       A sharp tenaculum was applied to the anterior lip of the cervix for stabilization.   A Pipelle endometrial aspirator was used to sample the endometrium.  Based on the markings on the pipelle, the uterus sounded to 7 cm.  Small amount of sample was obtained and sent for pathologic examination.    Condition:  Stable    Complications:  None    Plan:  Patient tolerated the procedure well  The patient was advised to call for any fever or for prolonged or severe pain or bleeding. She was advised to use OTC analgesics as needed for mild to moderate pain. She was advised to avoid vaginal intercourse for 48 hours or until the bleeding has completely stopped.    Swati Montes De Oca MD

## 2020-02-06 NOTE — PROGRESS NOTES
SUBJECTIVE:   Chief Complaint   Patient presents with   • Menstrual Problem      Int ID [id number]  Kristen Mota is a 30 y.o.  who presents for follow up for abnormal bleeding. She reports that she stopped bleeding with the progestin pill.  She is interested in conception.  Her  has no proven fertility.     HPI:  Bleeding started 20.  She has been bleeding most days (sometimes has one day of no bleeding). Has not had bleeding like this before.  2019 was her last period before that. Since this bleeding has started she has had a lot of pain in her stomach. Prior to 2019, every 1-2 months would have a period.  On her heaviest day in the last month, she has had to change feminine hygiene products 3 to 4 times per day.  She reports passage of clots - has a picture that shows a dark clot about the size of a walnut. Menarche: 19 yo. Was seen in ED for this. Had an ultrasound that showed cystic structures and fluid in the lower uterine segment.       History reviewed. No pertinent past medical history.  History reviewed. No pertinent surgical history.  OB History    Para Term  AB Living   0 0 0 0 0 0   SAB TAB Ectopic Molar Multiple Live Births   0 0 0 0 0 0      Social History     Tobacco Use   • Smoking status: Never Smoker   • Smokeless tobacco: Never Used   Substance Use Topics   • Alcohol use: Never     Frequency: Never   • Drug use: Never     Family History   Problem Relation Age of Onset   • Breast cancer Neg Hx    • Ovarian cancer Neg Hx    • Uterine cancer Neg Hx    • Colon cancer Neg Hx    • Deep vein thrombosis Neg Hx    • Pulmonary embolism Neg Hx      Current Outpatient Medications on File Prior to Visit   Medication Sig Dispense Refill   • ferrous sulfate 325 (65 FE) MG tablet Take 1 tablet by mouth Daily With Breakfast. 30 tablet 6   • medroxyPROGESTERone (PROVERA) 10 MG tablet Take 1 tablet by mouth Daily for 14 days. 14 tablet 0   • [DISCONTINUED] ibuprofen  "(ADVIL,MOTRIN) 800 MG tablet Take 1 tablet by mouth Every 8 (Eight) Hours As Needed for Mild Pain  (pain. take with food.). 30 tablet 0     No current facility-administered medications on file prior to visit.      No Known Allergies     Review of Systems   Constitutional: Negative.    HENT: Negative.    Respiratory: Negative.    Cardiovascular: Negative.    Gastrointestinal: Negative.    Endocrine: Negative.    Genitourinary: Negative.    Musculoskeletal: Negative.    Skin: Negative.    Neurological: Negative.    Psychiatric/Behavioral: Negative.          OBJECTIVE:   Vitals:    02/06/20 1113   BP: 114/76   Pulse: 85   Weight: 73 kg (161 lb)   Height: 165.1 cm (65\")      Physical Exam   Constitutional: She is oriented to person, place, and time. She appears well-developed and well-nourished. No distress.   HENT:   Head: Normocephalic and atraumatic.   Eyes: EOM are normal. No scleral icterus.   Neck: Normal range of motion.   Cardiovascular: Normal rate and regular rhythm.   Pulmonary/Chest: Effort normal. No respiratory distress.   Abdominal: Soft. She exhibits no distension.   Genitourinary: Uterus normal and cervix normal. There is no rash, tenderness, lesion, injury or Bartholin's cyst on the right labia. There is no rash, tenderness, lesion, injury or Bartholin's cyst on the left labia. Cervix does not exhibit motion tenderness. Right adnexum displays no mass, no tenderness and no fullness. Right adnexum is present.Left adnexum displays no mass, no tenderness and no fullness. Left adnexum is present.Vagina exhibits no lesion and no loss of rugae. No erythema, tenderness or bleeding in the vagina. No foreign body in the vagina. No signs of injury around the vagina. No vaginal discharge found.   Musculoskeletal: Normal range of motion.   Neurological: She is alert and oriented to person, place, and time.   Skin: Skin is warm and dry. No rash noted. She is not diaphoretic. No erythema.   Psychiatric: She has a " normal mood and affect. Her behavior is normal. Judgment and thought content normal.         Ultrasound: Uterus 7.46 x 4.40 by 3.86 cm, endometrial thickness 1.23 cm PCO- appearing bilateral ovaries, free fluid approximately 3cm in posterior cul de sac  ASSESSMENT/PLAN:     ICD-10-CM ICD-9-CM   1. Abnormal uterine bleeding (AUB) N93.9 626.9     Reviewed with patient and her  the ultrasound findings.  We discussed that polycystic ovarian syndrome can cause endometrial hyperplasia and if untreated can lead to endometrial cancer over time.  I recommended given her bleeding history and PCO appearing ovaries, and endometrial biopsy.  Patient and her  agreed.  We will perform that today.  I also recommended hemoglobinopathy evaluation and iron studies given her anemia.  She desires pregnancy and will be given information regarding semen analysis.  Upon completion of the studies, she was recommended to come back to discuss ovulation induction.  She agrees to endometrial biopsy today. Continue progestin for now    Return in about 4 weeks (around 3/5/2020) for ovulation induction.

## 2020-02-07 LAB
HGB A MFR BLD: 97.7 % (ref 96.4–98.8)
HGB A2 MFR BLD COLUMN CHROM: 2.3 % (ref 1.8–3.2)
HGB C MFR BLD: 0 %
HGB F MFR BLD: 0 % (ref 0–2)
HGB FRACT BLD-IMP: NORMAL
HGB S BLD QL SOLY: NEGATIVE
HGB S MFR BLD: 0 %
IRON SATN MFR SERPL: 10 % (ref 20–50)
IRON SERPL-MCNC: 37 MCG/DL (ref 37–145)
TIBC SERPL-MCNC: 386 MCG/DL
UIBC SERPL-MCNC: 349 MCG/DL (ref 112–346)

## 2020-02-10 LAB
DX ICD CODE: NORMAL
PATH REPORT.FINAL DX SPEC: NORMAL
PATH REPORT.GROSS SPEC: NORMAL
PATH REPORT.SITE OF ORIGIN SPEC: NORMAL
PATHOLOGIST NAME: NORMAL
PAYMENT PROCEDURE: NORMAL

## 2020-03-04 ENCOUNTER — OFFICE VISIT (OUTPATIENT)
Dept: OBSTETRICS AND GYNECOLOGY | Facility: CLINIC | Age: 30
End: 2020-03-04

## 2020-03-04 VITALS
SYSTOLIC BLOOD PRESSURE: 136 MMHG | BODY MASS INDEX: 27.99 KG/M2 | WEIGHT: 168 LBS | HEART RATE: 88 BPM | DIASTOLIC BLOOD PRESSURE: 87 MMHG | HEIGHT: 65 IN

## 2020-03-04 DIAGNOSIS — N93.9 ABNORMAL UTERINE BLEEDING (AUB): Primary | ICD-10-CM

## 2020-03-04 PROCEDURE — 99213 OFFICE O/P EST LOW 20 MIN: CPT | Performed by: OBSTETRICS & GYNECOLOGY

## 2020-03-04 NOTE — PROGRESS NOTES
SUBJECTIVE:   Chief Complaint   Patient presents with   • conception      Int ID [id number]  Kristen Mota is a 30 y.o.  who presents for follow up of AUB and desires fertility. . Patient's last menstrual period was 02/15/2020 (exact date). This day it started with stomach pain and then she had some bleeding, worse in the afternoon. Has been happening every day since 2/15/2020    History reviewed. No pertinent past medical history.  History reviewed. No pertinent surgical history.  OB History    Para Term  AB Living   0 0 0 0 0 0   SAB TAB Ectopic Molar Multiple Live Births   0 0 0 0 0 0      Social History     Tobacco Use   • Smoking status: Never Smoker   • Smokeless tobacco: Never Used   Substance Use Topics   • Alcohol use: Never     Frequency: Never   • Drug use: Never     Family History   Problem Relation Age of Onset   • Breast cancer Neg Hx    • Ovarian cancer Neg Hx    • Uterine cancer Neg Hx    • Colon cancer Neg Hx    • Deep vein thrombosis Neg Hx    • Pulmonary embolism Neg Hx      Current Outpatient Medications on File Prior to Visit   Medication Sig Dispense Refill   • ferrous sulfate 325 (65 FE) MG tablet Take 1 tablet by mouth Daily With Breakfast. 30 tablet 6   • medroxyPROGESTERone (PROVERA) 10 MG tablet Take 1 tablet by mouth Daily for 30 days. 30 tablet 1     No current facility-administered medications on file prior to visit.      No Known Allergies     Review of Systems   Constitutional: Negative for activity change, appetite change, fatigue, fever and unexpected weight change.   Gastrointestinal: Negative for abdominal pain, nausea and vomiting.   Genitourinary: Positive for menstrual problem and vaginal bleeding. Negative for vaginal discharge and vaginal pain.   Hematological: Does not bruise/bleed easily.   Psychiatric/Behavioral: Negative for agitation.         OBJECTIVE:   Vitals:    20 1517   BP: 136/87   Pulse: 88   Weight: 76.2 kg (168 lb)   Height: 165.1 cm  "(65\")      Physical Exam   Constitutional: She is oriented to person, place, and time. She appears well-developed and well-nourished. No distress.   HENT:   Head: Normocephalic and atraumatic.   Eyes: EOM are normal. No scleral icterus.   Neck: Normal range of motion.   Cardiovascular: Normal rate and regular rhythm.   Pulmonary/Chest: Effort normal. No respiratory distress.   Abdominal: Soft. She exhibits no distension.   Musculoskeletal: Normal range of motion.   Neurological: She is alert and oriented to person, place, and time.   Skin: Skin is warm and dry. No rash noted. She is not diaphoretic. No erythema.   Psychiatric: She has a normal mood and affect. Her behavior is normal. Judgment and thought content normal.       ASSESSMENT/PLAN:     ICD-10-CM ICD-9-CM   1. Abnormal uterine bleeding (AUB) N93.9 626.9       Patient counseled on biopsy pathology suggestive of polyps.  Given patient's irregular bleeding pattern, I am suspicious she may have a polyp inside the uterine cavity which could also be a limitation to her conceiving.  Her partner reports he did get a semen analysis but has not heard the results.  We do not have the results to me only available so we will request those records.    Her partner, the patient and I discussed the options including hysteroscopy with possible polypectomy, saline infusion sonogram, expectant management.  Patient would like to pursue saline infusion sonogram.  She was instructed to take ibuprofen approximately 1 hour before the appointment.  If she is having heavy bleeding she should call us prior to the appointment so that we can discuss if we need to reschedule.    Return in about 2 weeks (around 3/18/2020) for saline infusion ultraosund.            "

## 2020-03-11 ENCOUNTER — TELEPHONE (OUTPATIENT)
Dept: OBSTETRICS AND GYNECOLOGY | Facility: CLINIC | Age: 30
End: 2020-03-11

## 2020-03-11 NOTE — TELEPHONE ENCOUNTER
----- Message from Swati Montes De Oca MD sent at 3/10/2020  4:36 PM EDT -----  Please call patient and let her know that the semen test results were normal.  If she has any questions, I am happy to answer them. Thanks!    20  Called patient to inform results, no answer. Left a message to return call. Will need patient's 's  to obtain results from [facility]

## 2020-03-26 ENCOUNTER — TELEPHONE (OUTPATIENT)
Dept: OBSTETRICS AND GYNECOLOGY | Facility: CLINIC | Age: 30
End: 2020-03-26

## 2020-03-26 NOTE — TELEPHONE ENCOUNTER
The situation is changing from day -to-day.  I am happy to move the appointment to 2 weeks from now, but if the restrictions increase there is a chance we would have to reschedule again, and it is also possible that in two weeks her partner will not be able to accompany her.  I understand that this can be frustrating, but we are trying to do our part to minimize exposure(s) and improve health and safety. You are welcome to give her an appointment in 2 weeks and we can readdress at that time. Thanks!

## 2020-03-26 NOTE — TELEPHONE ENCOUNTER
Called and spoke with  regarding today's appointment. Called back to let him know if she was still bleeding to come in today but had to leave message to call office back. If they call back, please try to move the appointment earlier and let them know spouse cannot come into the office. Thank you.

## 2020-03-26 NOTE — TELEPHONE ENCOUNTER
I rescheduled patient but they were not happy rescheduling out more than two weeks so they want to know if you will see them any sooner then date we changed too. They would like a call back before 3 since her partner who answered call works thank you

## 2020-03-29 ENCOUNTER — HOSPITAL ENCOUNTER (EMERGENCY)
Facility: HOSPITAL | Age: 30
Discharge: HOME | End: 2020-03-29
Attending: EMERGENCY MEDICINE | Admitting: EMERGENCY MEDICINE

## 2020-03-29 VITALS
DIASTOLIC BLOOD PRESSURE: 85 MMHG | HEART RATE: 81 BPM | HEIGHT: 65 IN | WEIGHT: 169 LBS | SYSTOLIC BLOOD PRESSURE: 121 MMHG | TEMPERATURE: 97.8 F | RESPIRATION RATE: 18 BRPM | OXYGEN SATURATION: 100 % | BODY MASS INDEX: 28.16 KG/M2

## 2020-03-29 DIAGNOSIS — M54.31 RIGHT SIDED SCIATICA: Primary | ICD-10-CM

## 2020-03-29 LAB
B-HCG UR QL: NEGATIVE
BILIRUB UR QL STRIP: NEGATIVE
CLARITY UR: CLEAR
COLOR UR: YELLOW
GLUCOSE UR STRIP-MCNC: NEGATIVE MG/DL
HGB UR QL STRIP.AUTO: NEGATIVE
KETONES UR QL STRIP: NEGATIVE
LEUKOCYTE ESTERASE UR QL STRIP.AUTO: NEGATIVE
NITRITE UR QL STRIP: NEGATIVE
PH UR STRIP.AUTO: 8 [PH] (ref 5–8)
PROT UR QL STRIP: NEGATIVE
SP GR UR STRIP: 1.01 (ref 1–1.03)
UROBILINOGEN UR QL STRIP: NORMAL

## 2020-03-29 PROCEDURE — 81025 URINE PREGNANCY TEST: CPT | Performed by: EMERGENCY MEDICINE

## 2020-03-29 PROCEDURE — 81003 URINALYSIS AUTO W/O SCOPE: CPT | Performed by: EMERGENCY MEDICINE

## 2020-03-29 PROCEDURE — 99283 EMERGENCY DEPT VISIT LOW MDM: CPT

## 2020-03-29 RX ORDER — HYDROCODONE BITARTRATE AND ACETAMINOPHEN 5; 325 MG/1; MG/1
2 TABLET ORAL ONCE
Status: COMPLETED | OUTPATIENT
Start: 2020-03-29 | End: 2020-03-29

## 2020-03-29 RX ORDER — METHYLPREDNISOLONE 4 MG/1
TABLET ORAL
Qty: 1 EACH | Refills: 0 | Status: SHIPPED | OUTPATIENT
Start: 2020-03-29 | End: 2020-04-23

## 2020-03-29 RX ADMIN — HYDROCODONE BITARTRATE AND ACETAMINOPHEN 2 TABLET: 5; 325 TABLET ORAL at 04:08

## 2020-04-08 RX ORDER — MEDROXYPROGESTERONE ACETATE 10 MG/1
TABLET ORAL
Qty: 30 TABLET | Refills: 1 | Status: SHIPPED | OUTPATIENT
Start: 2020-04-08 | End: 2020-04-23

## 2020-04-23 ENCOUNTER — OFFICE VISIT (OUTPATIENT)
Dept: OBSTETRICS AND GYNECOLOGY | Facility: CLINIC | Age: 30
End: 2020-04-23

## 2020-04-23 ENCOUNTER — TELEPHONE (OUTPATIENT)
Dept: OBSTETRICS AND GYNECOLOGY | Facility: CLINIC | Age: 30
End: 2020-04-23

## 2020-04-23 ENCOUNTER — PROCEDURE VISIT (OUTPATIENT)
Dept: OBSTETRICS AND GYNECOLOGY | Facility: CLINIC | Age: 30
End: 2020-04-23

## 2020-04-23 VITALS
WEIGHT: 174 LBS | SYSTOLIC BLOOD PRESSURE: 135 MMHG | BODY MASS INDEX: 28.99 KG/M2 | HEIGHT: 65 IN | DIASTOLIC BLOOD PRESSURE: 91 MMHG | HEART RATE: 76 BPM

## 2020-04-23 DIAGNOSIS — Z31.9 INFERTILITY MANAGEMENT: ICD-10-CM

## 2020-04-23 DIAGNOSIS — N93.9 ABNORMAL UTERINE BLEEDING (AUB): Primary | ICD-10-CM

## 2020-04-23 PROCEDURE — 76831 ECHO EXAM UTERUS: CPT | Performed by: OBSTETRICS & GYNECOLOGY

## 2020-04-23 PROCEDURE — 99213 OFFICE O/P EST LOW 20 MIN: CPT | Performed by: OBSTETRICS & GYNECOLOGY

## 2020-04-23 PROCEDURE — 81025 URINE PREGNANCY TEST: CPT | Performed by: OBSTETRICS & GYNECOLOGY

## 2020-04-23 PROCEDURE — 58340 CATHETER FOR HYSTEROGRAPHY: CPT | Performed by: OBSTETRICS & GYNECOLOGY

## 2020-04-23 RX ORDER — PNV NO.95/FERROUS FUM/FOLIC AC 28MG-0.8MG
1 TABLET ORAL DAILY
Qty: 90 TABLET | Refills: 4 | Status: SHIPPED | OUTPATIENT
Start: 2020-04-23

## 2020-04-23 RX ORDER — LETROZOLE 2.5 MG/1
TABLET, FILM COATED ORAL
Qty: 5 TABLET | Refills: 0 | Status: SHIPPED | OUTPATIENT
Start: 2020-04-23 | End: 2020-06-11 | Stop reason: SDUPTHER

## 2020-04-23 RX ORDER — LETROZOLE 2.5 MG/1
TABLET, FILM COATED ORAL
Qty: 5 TABLET | Refills: 0 | Status: SHIPPED | OUTPATIENT
Start: 2020-04-23 | End: 2020-04-23 | Stop reason: SDUPTHER

## 2020-04-23 NOTE — PATIENT INSTRUCTIONS
For ovulation induction, await a spontaneous menses or if no menses arises do Provera 10 mg daily for 10 days.  Take the ovulation induction agent (letrozole) days 3 through 7 of her cycle.  Have intercourse every other day starting on day 10 of your cycle

## 2020-04-23 NOTE — TELEPHONE ENCOUNTER
Called patient back with Int ID [id number].  She reports the letrozole was not covered by her insurance.    She was able to  PNV and will take one daily.    We called the pharmacy and they said with [facility] the medication would cost $5 at [facility] with [facility] application. Patient agreed to sending the medication there and is aware of the instructions.   Patient is aware of normal semen analysis.  She is clear on instructions with letrozole.      
Good Afternoon,    Patient called and has questions about her medications and would like a call back to discuss them.  
11-Apr-2020 19:01

## 2020-04-23 NOTE — PROGRESS NOTES
"SUBJECTIVE:   Chief Complaint   Patient presents with   • Vaginal Bleeding     pt reports she has been experiencing on and off vaginal bleeding since 3/25/20      [facility] Int ID [id number] and [id number]  Kristen Mota is a 30 y.o.  who presents for abnormal bleeding.  She was scheduled for a saline infusion ultrasound however due to the pandemic this was cancelled.  She reports she is still bleeding, in a week, she typically bleeds a couple days, but there is no regular pattern to it.  Some weeks she does not bleed at all.  On her heaviest day of bleeding, she bleeds lightly. On 3/20 she had her last \"regular period.\"  During that time, she did pass some clots, but since 3/25 it has been \"small bleeding.\"    She has been taking Provera every day  Denies any pain.     History reviewed. No pertinent past medical history.  History reviewed. No pertinent surgical history.  OB History    Para Term  AB Living   0 0 0 0 0 0   SAB TAB Ectopic Molar Multiple Live Births   0 0 0 0 0 0      Social History     Tobacco Use   • Smoking status: Never Smoker   • Smokeless tobacco: Never Used   Substance Use Topics   • Alcohol use: Never     Frequency: Never   • Drug use: Never     Family History   Problem Relation Age of Onset   • Breast cancer Neg Hx    • Ovarian cancer Neg Hx    • Uterine cancer Neg Hx    • Colon cancer Neg Hx    • Deep vein thrombosis Neg Hx    • Pulmonary embolism Neg Hx      Current Outpatient Medications on File Prior to Visit   Medication Sig Dispense Refill   • ferrous sulfate 325 (65 FE) MG tablet Take 1 tablet by mouth Daily With Breakfast. 30 tablet 6   • [DISCONTINUED] medroxyPROGESTERone (PROVERA) 10 MG tablet TAKE 1 TABLET BY MOUTH EVERY DAY 30 tablet 1   • [DISCONTINUED] methylPREDNISolone (MEDROL, BRISSA,) 4 MG tablet Take as directed on package instructions. 1 each 0     No current facility-administered medications on file prior to visit.      No Known Allergies     Review of " "Systems   Constitutional: Negative for activity change, appetite change, fatigue, fever and unexpected weight change.   Gastrointestinal: Negative for abdominal pain, nausea and vomiting.   Genitourinary: Positive for menstrual problem and vaginal bleeding. Negative for vaginal discharge and vaginal pain.   Hematological: Does not bruise/bleed easily.   Psychiatric/Behavioral: Negative for agitation.         OBJECTIVE:   Vitals:    04/23/20 0932   BP: 135/91   Pulse: 76   Weight: 78.9 kg (174 lb)   Height: 165.1 cm (65\")      Physical Exam   Constitutional: She is oriented to person, place, and time. She appears well-developed and well-nourished. No distress.   HENT:   Head: Normocephalic and atraumatic.   Eyes: EOM are normal. No scleral icterus.   Neck: Normal range of motion.   Cardiovascular: Normal rate and regular rhythm.   Pulmonary/Chest: Effort normal. No respiratory distress.   Abdominal: Soft. She exhibits no distension.   Genitourinary: Uterus normal and cervix normal. There is no rash, tenderness, lesion, injury or Bartholin's cyst on the right labia. There is no rash, tenderness, lesion, injury or Bartholin's cyst on the left labia. Cervix does not exhibit motion tenderness. Right adnexum displays no mass, no tenderness and no fullness. Right adnexum is present.Left adnexum displays no mass, no tenderness and no fullness. Left adnexum is present.Vagina exhibits no lesion and no loss of rugae. No erythema, tenderness or bleeding in the vagina. No foreign body in the vagina. No signs of injury around the vagina. No vaginal discharge found.   Musculoskeletal: Normal range of motion.   Neurological: She is alert and oriented to person, place, and time.   Skin: Skin is warm and dry. No rash noted. She is not diaphoretic. No erythema.   Psychiatric: She has a normal mood and affect. Her behavior is normal. Judgment and thought content normal.       ASSESSMENT/PLAN:     ICD-10-CM ICD-9-CM   1. Abnormal " uterine bleeding (AUB) N93.9 626.9   2. Infertility management Z31.9 V26.9       Counseled on findings of US- endometrial lining appears smooth without evidence of polyp.  She strongly desires pregnancy.  I have reviewed this patient's and counseled her on the options for further workup and treatment of infertility.  We have discussed the options of further testing (including but not limited to semen analysis, hysterosalpingogram, laboratory evaluation), referral to reproductive endocrinology and infertility specialist, proceeding with ovulation induction for 1-2 cycles without further workup.  She was counseled that ovulation induction agents can increase the risk of certain factors such as multiple gestation.  We discussed that there are 2 medications use for ovulation induction: Clomiphene citrate and letrozole.  Letrozole is not FDA approved drug and the use for ovulation induction is off label.  She was counseled that proceeding with ovulation induction without further testing can increase the risk of certain factors such as a tubal pregnancy given unknown tubal patency.  Patient elects to try letrozole.  She understands the risks and limitations of this decision.    For ovulation induction, she will await a spontaneous menses or if no menses arises she contact us to do Provera 10 mg daily for 10 days.  She was counseled to take the ovulation induction agent days 3 through 7 of her cycle.  We discussed use of ovulation prediction kits or serum progestin and the timing of intercourse.  She will stop Provera today and I reviewed that a menses is possible in the next 1-2 days. If so, would time as discussed.      Recommend follow up in 6 weeks or sooner if problems arise.        Return in about 6 weeks (around 6/4/2020).

## 2020-04-23 NOTE — PROGRESS NOTES
Saline Infusion Sonogram    Pre-operative Diagnosis: AUB, polyp suggested on EMB    Post-operative Diagnosis: Same    Counseling/indications:  Kristen Mota is a 30 y.o.  who presented with AUB and an endometrial biopsy which was suggestive of a polyp. She was counseled on options for management/work up and agreed to a saline infusion sonogram. She understood that the risks of this procedure include but are not limited to bleeding, pain, infection, injury to surrounding structures.  We reviewed timing of the procedure for the most accurate image and an appointment was scheduled for her.      Procedure Details    Urine pregnancy test was done and result was negative.  The risks (including infection, bleeding, pain, and uterine perforation) and benefits of the procedure were explained to the patient and Verbal informed consent was obtained.       A timeout procedure was performed.  The patient was placed in the dorsal lithotomy position.  Bimanual exam showed the uterus to be in the anteroflexed position.  An open-sided Graves' speculum inserted in the vagina, and the cervix prepped with povidone iodine.        A pipelle was placed into the cervix and the intrauterine balloon was inflated.  Transvaginal probe was placed by the sonographer.  After survey of the uterus, the intrauterine balloon was deflated and saline was injected into the uterus with the noted findings on pathology.  The pipelle was backed out to the lower uterine segment and more saline was injected.  A smooth contour was noted.  The pipelle was then removed.  A general ultrasound survey was performed of the uterus and adnexa with findings as mentioned in ultrasound report.    Condition:  Stable    Complications:  None    Plan:  Patient tolerated the procedure well  The patient was advised to call for any fever or for prolonged or severe pain or bleeding. She was advised to use OTC analgesics as needed for mild to moderate pain. She was  advised to avoid vaginal intercourse for 48 hours or until the bleeding has completely stopped.    Findings:  Normal uterine contour without evidence of submucosal fibroids/polyps.  Free fluid appeared in the pelvis indicated patency of at least one or both tubes.  Normal bilateral ovaries.  No evidence of hydrosalpinx.    Swati Montes De Oca MD

## 2020-06-11 ENCOUNTER — OFFICE VISIT (OUTPATIENT)
Dept: OBSTETRICS AND GYNECOLOGY | Facility: CLINIC | Age: 30
End: 2020-06-11

## 2020-06-11 VITALS
SYSTOLIC BLOOD PRESSURE: 120 MMHG | HEIGHT: 65 IN | WEIGHT: 177 LBS | BODY MASS INDEX: 29.49 KG/M2 | DIASTOLIC BLOOD PRESSURE: 79 MMHG | HEART RATE: 89 BPM

## 2020-06-11 DIAGNOSIS — N97.0 ANOVULATION: ICD-10-CM

## 2020-06-11 DIAGNOSIS — N93.9 ABNORMAL UTERINE BLEEDING (AUB): Primary | ICD-10-CM

## 2020-06-11 PROCEDURE — 99213 OFFICE O/P EST LOW 20 MIN: CPT | Performed by: OBSTETRICS & GYNECOLOGY

## 2020-06-11 PROCEDURE — 81025 URINE PREGNANCY TEST: CPT | Performed by: OBSTETRICS & GYNECOLOGY

## 2020-06-11 RX ORDER — MEDROXYPROGESTERONE ACETATE 10 MG/1
10 TABLET ORAL DAILY
Qty: 10 TABLET | Refills: 0 | Status: SHIPPED | OUTPATIENT
Start: 2020-06-11 | End: 2020-06-21

## 2020-06-11 RX ORDER — LETROZOLE 2.5 MG/1
TABLET, FILM COATED ORAL
Qty: 5 TABLET | Refills: 0 | Status: SHIPPED | OUTPATIENT
Start: 2020-06-11 | End: 2020-07-09

## 2020-06-11 NOTE — PROGRESS NOTES
SUBJECTIVE:   Chief Complaint   Patient presents with   • Menstrual Problem     pt reports some spotting on 20 for a day only      Int [id number]  Kristen Mota is a 30 y.o.  who presents for abnormal menses. Reports an episode of spotting 5/10 and then again on .  Took UPT 10 days ago which was negative. Prior , had premenstrual symptoms.      She was prescribed Provera and Letrozole in 2020; She started the letrozole 2020.  Had timed intercourse. In the last month, she has been feeling thirstier than normal and has had some pink tinged to her urine. For the first time last month, she had suprapubic pain while urinating.  Denies frequency, urgency, dysuria. She denies any current abnormalities with urination    Prior work up includes:  EMB suggesting polyp 2020  SIS which showed normal uterine contour 2020    No past medical history on file.  No past surgical history on file.  OB History    Para Term  AB Living   0 0 0 0 0 0   SAB TAB Ectopic Molar Multiple Live Births   0 0 0 0 0 0      Social History     Tobacco Use   • Smoking status: Never Smoker   • Smokeless tobacco: Never Used   Substance Use Topics   • Alcohol use: Never     Frequency: Never   • Drug use: Never     Family History   Problem Relation Age of Onset   • Breast cancer Neg Hx    • Ovarian cancer Neg Hx    • Uterine cancer Neg Hx    • Colon cancer Neg Hx    • Deep vein thrombosis Neg Hx    • Pulmonary embolism Neg Hx      Current Outpatient Medications on File Prior to Visit   Medication Sig Dispense Refill   • ferrous sulfate 325 (65 FE) MG tablet Take 1 tablet by mouth Daily With Breakfast. 30 tablet 6   • Prenatal Vit-Fe Fumarate-FA (PRENATAL VITAMIN 28-0.8) 28-0.8 MG tablet tablet Take 1 tablet by mouth Daily. 90 tablet 4   • UNKNOWN TO PATIENT Medication for leg injury.     • [DISCONTINUED] letrozole (FEMARA) 2.5 MG tablet Take 2.5 mg daily 3 through 7 of cycle 5 tablet 0     No current  "facility-administered medications on file prior to visit.      No Known Allergies     Review of Systems   Constitutional: Negative.    HENT: Negative.    Respiratory: Negative.    Cardiovascular: Negative.    Gastrointestinal: Negative.    Endocrine: Negative.    Genitourinary: Negative.    Musculoskeletal: Negative.    Skin: Negative.    Neurological: Negative.    Psychiatric/Behavioral: Negative.          OBJECTIVE:   Vitals:    06/11/20 0932   BP: 120/79   Pulse: 89   Weight: 80.3 kg (177 lb)   Height: 165.1 cm (65\")      Physical Exam   Constitutional: She is oriented to person, place, and time. She appears well-developed and well-nourished. No distress.   HENT:   Head: Normocephalic and atraumatic.   Eyes: EOM are normal. No scleral icterus.   Neck: Normal range of motion.   Cardiovascular: Normal rate and regular rhythm.   Pulmonary/Chest: Effort normal. No respiratory distress.   Abdominal: Soft. She exhibits no distension.   Musculoskeletal: Normal range of motion.   Neurological: She is alert and oriented to person, place, and time.   Skin: Skin is warm and dry. No rash noted. She is not diaphoretic. No erythema.   Psychiatric: She has a normal mood and affect. Her behavior is normal. Judgment and thought content normal.       ASSESSMENT/PLAN:     ICD-10-CM ICD-9-CM   1. Abnormal uterine bleeding (AUB) N93.9 626.9   2. Anovulation N97.0 628.0       UPT negative today  Reviewed in detail via Interpretor instructions for Femara.  She was unsure what she took and when she took it last cycle.  I provided her written instructions on how to take the medication. Will start with 2.5mg dose again. Aware of risks/benefits of OI and that letrozole is not FDA labeled for OI.  Pt had no further questions and expressed understanding of plan for 10 days provera, counting from first day of cycle to do letrozoel d3-7 and then timed intercourse.    Return in about 1 month (around 7/11/2020).            "

## 2020-07-09 ENCOUNTER — OFFICE VISIT (OUTPATIENT)
Dept: OBSTETRICS AND GYNECOLOGY | Facility: CLINIC | Age: 30
End: 2020-07-09

## 2020-07-09 VITALS
DIASTOLIC BLOOD PRESSURE: 85 MMHG | BODY MASS INDEX: 29.49 KG/M2 | WEIGHT: 177 LBS | HEART RATE: 76 BPM | SYSTOLIC BLOOD PRESSURE: 126 MMHG | HEIGHT: 65 IN

## 2020-07-09 DIAGNOSIS — N93.9 ABNORMAL UTERINE BLEEDING (AUB): Primary | ICD-10-CM

## 2020-07-09 DIAGNOSIS — N97.0 INFERTILITY, ANOVULATION: ICD-10-CM

## 2020-07-09 PROCEDURE — 99213 OFFICE O/P EST LOW 20 MIN: CPT | Performed by: OBSTETRICS & GYNECOLOGY

## 2020-07-09 PROCEDURE — 81025 URINE PREGNANCY TEST: CPT | Performed by: OBSTETRICS & GYNECOLOGY

## 2020-07-09 NOTE — PROGRESS NOTES
SUBJECTIVE:   Chief Complaint   Patient presents with   • Menstrual Problem     pt is following up from last visit due to AUB      Int ID [id number]  Kristen Mota is a 30 y.o.  who presents for follow up of ovulation induction. She reports on 2020 she took Provera and had a period from -.  On , she started taking letrozole. She had not side effects and did well with the medicine.      History reviewed. No pertinent past medical history.  History reviewed. No pertinent surgical history.  OB History    Para Term  AB Living   0 0 0 0 0 0   SAB TAB Ectopic Molar Multiple Live Births   0 0 0 0 0 0      Social History     Tobacco Use   • Smoking status: Never Smoker   • Smokeless tobacco: Never Used   Substance Use Topics   • Alcohol use: Never     Frequency: Never   • Drug use: Never     Family History   Problem Relation Age of Onset   • Breast cancer Neg Hx    • Ovarian cancer Neg Hx    • Uterine cancer Neg Hx    • Colon cancer Neg Hx    • Deep vein thrombosis Neg Hx    • Pulmonary embolism Neg Hx      Current Outpatient Medications on File Prior to Visit   Medication Sig Dispense Refill   • ferrous sulfate 325 (65 FE) MG tablet Take 1 tablet by mouth Daily With Breakfast. 30 tablet 6   • Prenatal Vit-Fe Fumarate-FA (PRENATAL VITAMIN 28-0.8) 28-0.8 MG tablet tablet Take 1 tablet by mouth Daily. 90 tablet 4   • [DISCONTINUED] letrozole (FEMARA) 2.5 MG tablet Take 2.5 mg daily 3 through 7 of cycle 5 tablet 0   • [DISCONTINUED] UNKNOWN TO PATIENT Medication for leg injury.       No current facility-administered medications on file prior to visit.      No Known Allergies     Review of Systems   Constitutional: Negative.    HENT: Negative.    Respiratory: Negative.    Cardiovascular: Negative.    Gastrointestinal: Negative.    Endocrine: Negative.    Genitourinary: Negative.    Musculoskeletal: Negative.    Skin: Negative.    Neurological: Negative.    Psychiatric/Behavioral: Negative.   "        OBJECTIVE:   Vitals:    07/09/20 1141   BP: 126/85   Pulse: 76   Weight: 80.3 kg (177 lb)   Height: 165.1 cm (65\")      Physical Exam   Constitutional: She is oriented to person, place, and time. She appears well-developed and well-nourished. No distress.   HENT:   Head: Normocephalic and atraumatic.   Eyes: EOM are normal. No scleral icterus.   Neck: Normal range of motion.   Cardiovascular: Normal rate and regular rhythm.   Pulmonary/Chest: Effort normal. No respiratory distress.   Abdominal: Soft. She exhibits no distension.   Musculoskeletal: Normal range of motion.   Neurological: She is alert and oriented to person, place, and time.   Skin: Skin is warm and dry. No rash noted. She is not diaphoretic. No erythema.   Psychiatric: She has a normal mood and affect. Her behavior is normal. Judgment and thought content normal.       ASSESSMENT/PLAN:     ICD-10-CM ICD-9-CM   1. Abnormal uterine bleeding (AUB) N93.9 626.9   2. Infertility, anovulation N97.0 628.0       Pt completed letrozole this cycle. We will get progesterone level and serum hcg (although I do not expect this to be positive). She is having regular intercourse.  She was recommended to take a UPT in 2 weeks (would prefer to come to office) and if negative, RTO in 1w for repeat UPT and office visit.  If positive, get serum hcg.  PT expressed agreement and understanding    Return in about 2 weeks (around 7/23/2020) for nursing visit for UPT, 3 weeks for visit with me.            "

## 2020-07-09 NOTE — PATIENT INSTRUCTIONS
Take a pregnancy test in 2 weeks if no menses.  If negative, repeat in one week.  If positive call office.  If negative in 3 weeks, call office to schedule follow up for next cycle.

## 2020-07-10 ENCOUNTER — TELEPHONE (OUTPATIENT)
Dept: OBSTETRICS AND GYNECOLOGY | Facility: CLINIC | Age: 30
End: 2020-07-10

## 2020-07-10 DIAGNOSIS — N97.0 INFERTILITY, ANOVULATION: Primary | ICD-10-CM

## 2020-07-10 LAB
ERYTHROCYTE [DISTWIDTH] IN BLOOD BY AUTOMATED COUNT: 13.6 % (ref 12.3–15.4)
HCG INTACT+B SERPL-ACNC: 1.28 MIU/ML
HCT VFR BLD AUTO: 40.3 % (ref 34–46.6)
HGB BLD-MCNC: 13.4 G/DL (ref 12–15.9)
MCH RBC QN AUTO: 27.8 PG (ref 26.6–33)
MCHC RBC AUTO-ENTMCNC: 33.3 G/DL (ref 31.5–35.7)
MCV RBC AUTO: 83.6 FL (ref 79–97)
PLATELET # BLD AUTO: 372 10*3/MM3 (ref 140–450)
PROGEST SERPL-MCNC: 0.2 NG/ML
RBC # BLD AUTO: 4.82 10*6/MM3 (ref 3.77–5.28)
WBC # BLD AUTO: 4.27 10*3/MM3 (ref 3.4–10.8)

## 2020-07-10 NOTE — TELEPHONE ENCOUNTER
----- Message from Swati Montes De Oca MD sent at 7/10/2020  8:17 AM EDT -----  Please call patient and let her know that her test results were normal.  The progesterone is within the range of ovulation, although on the lower side.  Her hcg (pregnancy hormone) was negative which I would expect. Her blood count looks much better.  I would recommend continuing with plan (UPT and/or serum hcg in 2 weeks).  If negative, office visit in 3 weeks.  If she has any questions, I am happy to answer them. Thanks!    07/10/20  Called patient to inform results via a [employer] , no answer. Left a message to return call.

## 2020-07-24 ENCOUNTER — TELEPHONE (OUTPATIENT)
Dept: OBSTETRICS AND GYNECOLOGY | Facility: CLINIC | Age: 30
End: 2020-07-24

## 2020-07-24 LAB — HCG INTACT+B SERPL-ACNC: 1.02 MIU/ML

## 2020-07-24 NOTE — TELEPHONE ENCOUNTER
----- Message from Swati Montes De Oca MD sent at 7/24/2020  8:14 AM EDT -----  Please call patient and let her know that her serum hcg is negative. I recommend coming in for repeat UPT in 1 week and an appointment at that time. Thanks!    07/24/20  Pt has been informed of negative hcg and to repeat UPT at her upcoming appt 7/30/20 via a [employer]  ID [id number]

## 2020-07-30 ENCOUNTER — OFFICE VISIT (OUTPATIENT)
Dept: OBSTETRICS AND GYNECOLOGY | Facility: CLINIC | Age: 30
End: 2020-07-30

## 2020-07-30 VITALS
HEART RATE: 72 BPM | SYSTOLIC BLOOD PRESSURE: 137 MMHG | BODY MASS INDEX: 29.32 KG/M2 | WEIGHT: 176 LBS | DIASTOLIC BLOOD PRESSURE: 2 MMHG | HEIGHT: 65 IN

## 2020-07-30 DIAGNOSIS — N97.0 ANOVULATION: Primary | ICD-10-CM

## 2020-07-30 PROCEDURE — 99213 OFFICE O/P EST LOW 20 MIN: CPT | Performed by: OBSTETRICS & GYNECOLOGY

## 2020-07-30 PROCEDURE — 81025 URINE PREGNANCY TEST: CPT | Performed by: OBSTETRICS & GYNECOLOGY

## 2020-07-30 RX ORDER — MEDROXYPROGESTERONE ACETATE 10 MG/1
10 TABLET ORAL DAILY
Qty: 10 TABLET | Refills: 0 | Status: SHIPPED | OUTPATIENT
Start: 2020-07-30 | End: 2020-08-09

## 2020-07-30 RX ORDER — LETROZOLE 2.5 MG/1
TABLET, FILM COATED ORAL
Qty: 10 TABLET | Refills: 0 | Status: SHIPPED | OUTPATIENT
Start: 2020-07-30 | End: 2020-09-10

## 2020-07-30 NOTE — PROGRESS NOTES
"SUBJECTIVE:   Chief Complaint   Patient presents with   • fertility     pt's LMP is 20    Int ID [id number]    Kristen Mota is a 30 y.o.  who presents for follow up of anovulation. She reports that she took the medication accurately. Had a negative serum pregnancy test last week and a negative UPT today.  She denies recent periods.      History reviewed. No pertinent past medical history.  History reviewed. No pertinent surgical history.  OB History    Para Term  AB Living   0 0 0 0 0 0   SAB TAB Ectopic Molar Multiple Live Births   0 0 0 0 0 0      Social History     Tobacco Use   • Smoking status: Never Smoker   • Smokeless tobacco: Never Used   Substance Use Topics   • Alcohol use: Never     Frequency: Never   • Drug use: Never     Family History   Problem Relation Age of Onset   • Breast cancer Neg Hx    • Ovarian cancer Neg Hx    • Uterine cancer Neg Hx    • Colon cancer Neg Hx    • Deep vein thrombosis Neg Hx    • Pulmonary embolism Neg Hx      Current Outpatient Medications on File Prior to Visit   Medication Sig Dispense Refill   • ferrous sulfate 325 (65 FE) MG tablet Take 1 tablet by mouth Daily With Breakfast. 30 tablet 6   • Prenatal Vit-Fe Fumarate-FA (PRENATAL VITAMIN 28-0.8) 28-0.8 MG tablet tablet Take 1 tablet by mouth Daily. 90 tablet 4     No current facility-administered medications on file prior to visit.      No Known Allergies     Review of Systems   Constitutional: Negative.    HENT: Negative.    Respiratory: Negative.    Cardiovascular: Negative.    Gastrointestinal: Negative.    Endocrine: Negative.    Genitourinary: Negative.    Musculoskeletal: Negative.    Skin: Negative.    Neurological: Negative.    Psychiatric/Behavioral: Negative.          OBJECTIVE:   Vitals:    20 1135   BP: (!) 137/2   Pulse: 72   Weight: 79.8 kg (176 lb)   Height: 165.1 cm (65\")      Physical Exam   Constitutional: She is oriented to person, place, and time. She appears " well-developed and well-nourished. No distress.   HENT:   Head: Normocephalic and atraumatic.   Eyes: EOM are normal. No scleral icterus.   Neck: Normal range of motion.   Cardiovascular: Normal rate and regular rhythm.   Pulmonary/Chest: Effort normal. No respiratory distress.   Abdominal: Soft. She exhibits no distension.   Musculoskeletal: Normal range of motion.   Neurological: She is alert and oriented to person, place, and time.   Skin: Skin is warm and dry. No rash noted. She is not diaphoretic. No erythema.   Psychiatric: She has a normal mood and affect. Her behavior is normal. Judgment and thought content normal.       ASSESSMENT/PLAN:     ICD-10-CM ICD-9-CM   1. Anovulation N97.0 628.0       I spent at least 10 minutes of 15 minute visit in face-to-face counseling on the options for further workup and treatment of infertility.  We have discussed the options of further testing (including but not limited to semen analysis, hysterosalpingogram, laboratory evaluation), referral to reproductive endocrinology and infertility specialist, proceeding with ovulation induction for 1-2 cycles without further workup.  She was counseled that ovulation induction agents can increase the risk of certain factors such as multiple gestation.  We discussed that there are 2 medications use for ovulation induction: Clomiphene citrate and letrozole.  Letrozole is not FDA approved drug and the use for ovulation induction is off label.  She was counseled that proceeding with ovulation induction without further testing can increase the risk of certain factors such as a tubal pregnancy given unknown tubal patency.  Patient elects to continue letrozole at 5mg.  She understands the risks and limitations of this decision.    For ovulation induction, she will await a spontaneous menses or if no menses arises she will do Provera 10 mg daily for 10 days.  She was counseled to take the ovulation induction agent days 3 through 7 of her  cycle.  We discussed use of ovulation prediction kits or serum progestin and the timing of intercourse.       Return in about 1 month (around 8/31/2020) for lab draw, approx 9/7 for GYN visit.

## 2020-08-13 ENCOUNTER — TELEPHONE (OUTPATIENT)
Dept: OBSTETRICS AND GYNECOLOGY | Facility: CLINIC | Age: 30
End: 2020-08-13

## 2020-08-13 NOTE — TELEPHONE ENCOUNTER
08/13/20  Called pt to both contact numbers on demographics via a [employer]  ID # [id number]. No answer on either of the numbers. Left msg on one and no the voicemail has not been set up yet on another.

## 2020-08-13 NOTE — TELEPHONE ENCOUNTER
Good Afternoon,     Patient called and requested you call her back. She did not tell me what it was in regards to.      # [phone]

## 2020-08-14 NOTE — TELEPHONE ENCOUNTER
Pt called, Call was returned via .     Pt states that the medication did not work and she was told to call back after 3 days. It has been 5 days now and nothing has changed. Please advise?

## 2020-08-14 NOTE — TELEPHONE ENCOUNTER
I would need clarification - she was given two medications (Provera or medroxyprogesterone to take for 10 days and then letrozole to take).  Did she bleed after taking the provera?  Or in what way did it not work? Thanks!

## 2020-08-17 ENCOUNTER — TELEPHONE (OUTPATIENT)
Dept: OBSTETRICS AND GYNECOLOGY | Facility: CLINIC | Age: 30
End: 2020-08-17

## 2020-08-17 DIAGNOSIS — N97.0 ANOVULATION: Primary | ICD-10-CM

## 2020-08-17 NOTE — TELEPHONE ENCOUNTER
"Called pt via a Adriana \"[facility]\" ID [id number]. Pt stated that instead of starting her cycle within 10 days of starting provera  she started her cycle on the 15th from starting provera. She sta she started the Letrozole on the 3 day of her cycles. Pt states she is not having any issues but just wanted to let us know she started her cycle on the 15th day from taking the provera.   "

## 2020-08-17 NOTE — TELEPHONE ENCOUNTER
Patient called and requested again to talk to Dr. Montes De Oca. She did not want to give me any information stating it was a private matter.    Ph # [phone]

## 2020-08-17 NOTE — TELEPHONE ENCOUNTER
Thank you, patient may keep appointment as scheduled and come in the day before for hcg lab. Thanks!

## 2020-08-17 NOTE — TELEPHONE ENCOUNTER
08/17/20   Called pt via [employer]  ID [id number]. Pt has been informed to keep appt 9/10/20 and to come a day before 9/9/20 for hcg lab

## 2020-08-19 RX ORDER — LETROZOLE 2.5 MG/1
TABLET, FILM COATED ORAL
Refills: 0 | OUTPATIENT
Start: 2020-08-19

## 2020-08-31 ENCOUNTER — RESULTS ENCOUNTER (OUTPATIENT)
Dept: OBSTETRICS AND GYNECOLOGY | Facility: CLINIC | Age: 30
End: 2020-08-31

## 2020-08-31 DIAGNOSIS — N97.0 ANOVULATION: ICD-10-CM

## 2020-09-10 ENCOUNTER — OFFICE VISIT (OUTPATIENT)
Dept: OBSTETRICS AND GYNECOLOGY | Facility: CLINIC | Age: 30
End: 2020-09-10

## 2020-09-10 VITALS
BODY MASS INDEX: 28.99 KG/M2 | HEIGHT: 65 IN | HEART RATE: 83 BPM | WEIGHT: 174 LBS | SYSTOLIC BLOOD PRESSURE: 125 MMHG | DIASTOLIC BLOOD PRESSURE: 85 MMHG

## 2020-09-10 DIAGNOSIS — N97.0 INFERTILITY, ANOVULATION: Primary | ICD-10-CM

## 2020-09-10 LAB — HCG INTACT+B SERPL-ACNC: 1.08 MIU/ML

## 2020-09-10 PROCEDURE — 99214 OFFICE O/P EST MOD 30 MIN: CPT | Performed by: OBSTETRICS & GYNECOLOGY

## 2020-09-18 ENCOUNTER — TELEPHONE (OUTPATIENT)
Dept: OBSTETRICS AND GYNECOLOGY | Facility: CLINIC | Age: 30
End: 2020-09-18

## 2020-09-18 LAB — HCG INTACT+B SERPL-ACNC: <0.5 MIU/ML

## 2020-09-18 RX ORDER — MEDROXYPROGESTERONE ACETATE 10 MG/1
10 TABLET ORAL DAILY
Qty: 10 TABLET | Refills: 0 | Status: SHIPPED | OUTPATIENT
Start: 2020-09-18 | End: 2020-09-28

## 2020-09-18 RX ORDER — LETROZOLE 2.5 MG/1
TABLET, FILM COATED ORAL
Qty: 10 TABLET | Refills: 0 | Status: SHIPPED | OUTPATIENT
Start: 2020-09-18 | End: 2020-10-29

## 2020-10-05 RX ORDER — FERROUS SULFATE 325(65) MG
TABLET ORAL
Qty: 30 TABLET | Refills: 0 | Status: SHIPPED | OUTPATIENT
Start: 2020-10-05 | End: 2020-10-29

## 2020-10-09 RX ORDER — LETROZOLE 2.5 MG/1
TABLET, FILM COATED ORAL
Refills: 0 | OUTPATIENT
Start: 2020-10-09

## 2020-10-29 ENCOUNTER — TELEPHONE (OUTPATIENT)
Dept: OBSTETRICS AND GYNECOLOGY | Facility: CLINIC | Age: 30
End: 2020-10-29

## 2020-10-29 ENCOUNTER — OFFICE VISIT (OUTPATIENT)
Dept: OBSTETRICS AND GYNECOLOGY | Facility: CLINIC | Age: 30
End: 2020-10-29

## 2020-10-29 VITALS
HEIGHT: 65 IN | DIASTOLIC BLOOD PRESSURE: 84 MMHG | SYSTOLIC BLOOD PRESSURE: 125 MMHG | BODY MASS INDEX: 29.66 KG/M2 | HEART RATE: 74 BPM | WEIGHT: 178 LBS

## 2020-10-29 DIAGNOSIS — N97.0 INFERTILITY, ANOVULATION: Primary | ICD-10-CM

## 2020-10-29 PROCEDURE — 81025 URINE PREGNANCY TEST: CPT | Performed by: OBSTETRICS & GYNECOLOGY

## 2020-10-29 PROCEDURE — 99213 OFFICE O/P EST LOW 20 MIN: CPT | Performed by: OBSTETRICS & GYNECOLOGY

## 2020-10-29 NOTE — TELEPHONE ENCOUNTER
Referral and records faxed to [facility] for review and scheduling. Informed their office to call me to schedule if [facility] not [facility] .

## 2020-10-29 NOTE — TELEPHONE ENCOUNTER
----- Message from Swati Montes De Oca MD sent at 10/29/2020 12:13 PM EDT -----  Nahomy- This patient's partner had a semen analysis with [facility].  She will need a [facility] Interpretor.  I provided her the phone numbers.

## 2020-10-29 NOTE — PATIENT INSTRUCTIONS
[facility]   [place], [place]   [phone]    [facility]  [place], [place]   [phone]

## 2020-10-29 NOTE — PROGRESS NOTES
SUBJECTIVE:   Chief Complaint   Patient presents with   • Anovulation        Kristen Mota is a 30 y.o.  who presents for discussion of ovulation induction. She took the letrozole for five days, stopped on  and started her period on 10/26.  This was her third cycle.      Her partner had a semen analysis in 2020.   She has had an HSG in 2020 that also was within normal limits, however US in 2020 was suspicious for polycystic ovaries.    Pap smear normal 2020.   Has not had [facility]H testing    History reviewed. No pertinent past medical history.  History reviewed. No pertinent surgical history.  OB History    Para Term  AB Living   0 0 0 0 0 0   SAB TAB Ectopic Molar Multiple Live Births   0 0 0 0 0 0      Social History     Tobacco Use   • Smoking status: Never Smoker   • Smokeless tobacco: Never Used   Substance Use Topics   • Alcohol use: Never     Frequency: Never   • Drug use: Never     Family History   Problem Relation Age of Onset   • Breast cancer Neg Hx    • Ovarian cancer Neg Hx    • Uterine cancer Neg Hx    • Colon cancer Neg Hx    • Deep vein thrombosis Neg Hx    • Pulmonary embolism Neg Hx      Current Outpatient Medications on File Prior to Visit   Medication Sig Dispense Refill   • Prenatal Vit-Fe Fumarate-FA (PRENATAL VITAMIN 28-0.8) 28-0.8 MG tablet tablet Take 1 tablet by mouth Daily. 90 tablet 4   • letrozole (FEMARA) 2.5 MG tablet Take 5mg daily on day 3-7 of cycle 10 tablet 0   • [DISCONTINUED] ferrous sulfate 325 (65 FE) MG tablet TAKE 1 TABLET BY MOUTH EVERY DAY WITH BREAKFAST 30 tablet 0     No current facility-administered medications on file prior to visit.      No Known Allergies     Review of Systems   Constitutional: Negative.    HENT: Negative.    Respiratory: Negative.    Cardiovascular: Negative.    Gastrointestinal: Negative.    Endocrine: Negative.    Genitourinary: Negative.    Musculoskeletal: Negative.    Skin: Negative.    Neurological:  "Negative.    Psychiatric/Behavioral: Negative.          OBJECTIVE:   Vitals:    10/29/20 1128   BP: 142/97   Pulse: 76   Weight: 80.7 kg (178 lb)   Height: 165.1 cm (65\")      Physical Exam  Constitutional:       General: She is not in acute distress.     Appearance: She is well-developed. She is not diaphoretic.   HENT:      Head: Normocephalic and atraumatic.   Eyes:      General: No scleral icterus.  Pulmonary:      Effort: Pulmonary effort is normal.      Breath sounds: Normal breath sounds.   Abdominal:      Tenderness: There is no abdominal tenderness.   Skin:     General: Skin is warm and dry.   Neurological:      Mental Status: She is alert and oriented to person, place, and time.   Psychiatric:         Behavior: Behavior normal.         Thought Content: Thought content normal.         Judgment: Judgment normal.         ASSESSMENT/PLAN:     ICD-10-CM ICD-9-CM   1. Infertility, anovulation  N97.0 628.0       Reviewed that she has had three cycles of Ovulation Induction without success.  I offered MICHAEL referral and she agreed.  Reviewed contact information.  Follow up as needed or for annual exam.                 "

## 2021-03-17 ENCOUNTER — OFFICE VISIT (OUTPATIENT)
Dept: OBSTETRICS AND GYNECOLOGY | Facility: CLINIC | Age: 31
End: 2021-03-17

## 2021-03-17 VITALS
WEIGHT: 181 LBS | HEART RATE: 84 BPM | BODY MASS INDEX: 30.16 KG/M2 | DIASTOLIC BLOOD PRESSURE: 85 MMHG | SYSTOLIC BLOOD PRESSURE: 128 MMHG | HEIGHT: 65 IN

## 2021-03-17 DIAGNOSIS — Z01.419 WELL WOMAN EXAM WITH ROUTINE GYNECOLOGICAL EXAM: Primary | ICD-10-CM

## 2021-03-17 PROCEDURE — 99395 PREV VISIT EST AGE 18-39: CPT | Performed by: OBSTETRICS & GYNECOLOGY

## 2021-03-17 RX ORDER — IBUPROFEN 800 MG/1
TABLET ORAL
COMMUNITY
Start: 2021-03-11

## 2021-03-17 NOTE — PROGRESS NOTES
Chief Complaint   Patient presents with   • Annual Exam     pap: 2020 NIL        Kristen Mota is a 31 y.o.  who presents for an annual examination.  Int ID [id number], Adriana.    Pap history:  Last pap: 2020 NIL  Prior abnormal paps: no  STDs  Sexually active: yes  History of STDs: no  Contraception:  Desires fertility  Was seen by MICHAEL in 2021, Dr. Stark  Reports she saw him in February as well, plan is to take a pregnancy test in the next cycle and then follow up.     Screening for BRCA-   Is patient's family history significant for BRCA risk factors? no    History reviewed. No pertinent past medical history.  History reviewed. No pertinent surgical history.  OB History    Para Term  AB Living   0 0 0 0 0 0   SAB TAB Ectopic Molar Multiple Live Births   0 0 0 0 0 0      Social History     Tobacco Use   • Smoking status: Never Smoker   • Smokeless tobacco: Never Used   Substance Use Topics   • Alcohol use: Never   • Drug use: Never     Family History   Problem Relation Age of Onset   • Breast cancer Neg Hx    • Ovarian cancer Neg Hx    • Uterine cancer Neg Hx    • Colon cancer Neg Hx    • Deep vein thrombosis Neg Hx    • Pulmonary embolism Neg Hx      Current Outpatient Medications on File Prior to Visit   Medication Sig Dispense Refill   • ibuprofen (ADVIL,MOTRIN) 800 MG tablet      • Prenatal Vit-Fe Fumarate-FA (PRENATAL VITAMIN 28-0.8) 28-0.8 MG tablet tablet Take 1 tablet by mouth Daily. 90 tablet 4     No current facility-administered medications on file prior to visit.     No Known Allergies     Review of Systems   Constitutional: Negative for activity change, appetite change, fatigue, fever and unexpected weight change.   Gastrointestinal: Negative for abdominal pain, nausea and vomiting.   Genitourinary: Negative for menstrual problem, vaginal bleeding, vaginal discharge and vaginal pain.   Hematological: Does not bruise/bleed easily.   Psychiatric/Behavioral: Negative  "for agitation.         OBJECTIVE:   Vitals:    03/17/21 1348   BP: 128/85   Pulse: 84   Weight: 82.1 kg (181 lb)   Height: 165.1 cm (65\")      Physical Exam   Constitutional: She is oriented to person, place, and time. She appears well-developed and well-nourished. No distress.   HENT:   Head: Normocephalic and atraumatic.   Eyes: No scleral icterus.   Neck: No thyromegaly present.   Cardiovascular: Normal rate and regular rhythm. Exam reveals no gallop and no friction rub.   No murmur heard.  Pulmonary/Chest: Effort normal and breath sounds normal. No respiratory distress. She has no wheezes. She has no rales. She exhibits no tenderness. Right breast exhibits no inverted nipple. Left breast exhibits no inverted nipple. No breast swelling, tenderness, discharge or bleeding. Breasts are symmetrical.   Abdominal: Soft. Bowel sounds are normal. She exhibits no distension. There is no abdominal tenderness. There is no guarding.   Genitourinary: Vagina normal and uterus normal. There is no rash, tenderness, lesion, injury or Bartholin's cyst on the right labia. There is no rash, tenderness, lesion, injury or Bartholin's cyst on the left labia. Uterus is not mobile.   Cervix is not parous. Cervix does not exhibit motion tenderness, discharge, friability, lesion, polyp, nabothian cyst, eversion, pinkness or cyanosis. Right adnexum displays no mass, no tenderness and no fullness. Right adnexum is present.Left adnexum displays no mass, no tenderness and no fullness. Left adnexum is present.No vaginal discharge found.   Musculoskeletal: No deformity.   Neurological: She is alert and oriented to person, place, and time.   Skin: Skin is warm and dry. She is not diaphoretic.   Psychiatric: Her speech is normal and behavior is normal. Judgment and thought content normal.       ASSESSMENT/PLAN:     Annual well woman exam:  Cervical cancer screening:    Denies cervical dysplasia in past   The patient is due for a pap in " 2023.    Screening guidelines discussed with patient  Breast cancer screening:    Clinical breast exam recommended for age 20-39 years every 1-3 years   Mammogram recommended starting age 40    Breast self awareness encouraged  STD Screening   Testing declined.    Contraception :   Declines, desires conception    Family history    does not demonstrate need for genetics referral   Healthy lifestyle counseling:   return for routine annual checkups   Infertility - seeing Dr. Lincoln LEE    BMI Counseling  Her BMI is classified as BMI 30.0-34.9        Classification: class I obesity.        Return in about 1 year (around 3/17/2022), or if symptoms worsen or fail to improve, for Annual physical.

## 2023-07-19 ENCOUNTER — APPOINTMENT (OUTPATIENT)
Dept: GENERAL RADIOLOGY | Facility: HOSPITAL | Age: 33
End: 2023-07-19
Payer: COMMERCIAL

## 2023-07-19 ENCOUNTER — APPOINTMENT (OUTPATIENT)
Dept: CT IMAGING | Facility: HOSPITAL | Age: 33
End: 2023-07-19
Payer: COMMERCIAL

## 2023-07-19 ENCOUNTER — HOSPITAL ENCOUNTER (EMERGENCY)
Facility: HOSPITAL | Age: 33
Discharge: HOME | End: 2023-07-19
Attending: EMERGENCY MEDICINE | Admitting: EMERGENCY MEDICINE
Payer: COMMERCIAL

## 2023-07-19 VITALS
SYSTOLIC BLOOD PRESSURE: 103 MMHG | OXYGEN SATURATION: 97 % | HEART RATE: 79 BPM | DIASTOLIC BLOOD PRESSURE: 78 MMHG | TEMPERATURE: 98 F | HEIGHT: 66 IN | BODY MASS INDEX: 27.32 KG/M2 | RESPIRATION RATE: 17 BRPM | WEIGHT: 170 LBS

## 2023-07-19 DIAGNOSIS — B96.89 ACUTE BACTERIAL BRONCHITIS: ICD-10-CM

## 2023-07-19 DIAGNOSIS — K21.9 GASTROESOPHAGEAL REFLUX DISEASE, UNSPECIFIED WHETHER ESOPHAGITIS PRESENT: ICD-10-CM

## 2023-07-19 DIAGNOSIS — J20.8 ACUTE BACTERIAL BRONCHITIS: ICD-10-CM

## 2023-07-19 DIAGNOSIS — R07.9 CHEST PAIN, UNSPECIFIED TYPE: Primary | ICD-10-CM

## 2023-07-19 DIAGNOSIS — R16.0 LIVER MASS: ICD-10-CM

## 2023-07-19 LAB
ALBUMIN SERPL-MCNC: 4 G/DL (ref 3.5–5.2)
ALBUMIN/GLOB SERPL: 1.2 G/DL
ALP SERPL-CCNC: 85 U/L (ref 39–117)
ALT SERPL W P-5'-P-CCNC: 11 U/L (ref 1–33)
ANION GAP SERPL CALCULATED.3IONS-SCNC: 9.8 MMOL/L (ref 5–15)
AST SERPL-CCNC: 10 U/L (ref 1–32)
BASOPHILS # BLD AUTO: 0.01 10*3/MM3 (ref 0–0.2)
BASOPHILS NFR BLD AUTO: 0.1 % (ref 0–1.5)
BILIRUB SERPL-MCNC: 0.4 MG/DL (ref 0–1.2)
BUN SERPL-MCNC: 10 MG/DL (ref 6–20)
BUN/CREAT SERPL: 16.1 (ref 7–25)
CALCIUM SPEC-SCNC: 9.3 MG/DL (ref 8.6–10.5)
CHLORIDE SERPL-SCNC: 100 MMOL/L (ref 98–107)
CO2 SERPL-SCNC: 26.2 MMOL/L (ref 22–29)
CREAT SERPL-MCNC: 0.62 MG/DL (ref 0.57–1)
DEPRECATED RDW RBC AUTO: 42.8 FL (ref 37–54)
EGFRCR SERPLBLD CKD-EPI 2021: 121.5 ML/MIN/1.73
EOSINOPHIL # BLD AUTO: 0.09 10*3/MM3 (ref 0–0.4)
EOSINOPHIL NFR BLD AUTO: 0.7 % (ref 0.3–6.2)
ERYTHROCYTE [DISTWIDTH] IN BLOOD BY AUTOMATED COUNT: 14 % (ref 12.3–15.4)
GEN 5 2HR TROPONIN T REFLEX: 6 NG/L
GLOBULIN UR ELPH-MCNC: 3.4 GM/DL
GLUCOSE SERPL-MCNC: 102 MG/DL (ref 65–99)
HCG SERPL QL: NEGATIVE
HCT VFR BLD AUTO: 38.9 % (ref 34–46.6)
HGB BLD-MCNC: 12.7 G/DL (ref 12–15.9)
IMM GRANULOCYTES # BLD AUTO: 0.02 10*3/MM3 (ref 0–0.05)
IMM GRANULOCYTES NFR BLD AUTO: 0.2 % (ref 0–0.5)
LIPASE SERPL-CCNC: 33 U/L (ref 13–60)
LYMPHOCYTES # BLD AUTO: 5.96 10*3/MM3 (ref 0.7–3.1)
LYMPHOCYTES NFR BLD AUTO: 45.4 % (ref 19.6–45.3)
MCH RBC QN AUTO: 27.4 PG (ref 26.6–33)
MCHC RBC AUTO-ENTMCNC: 32.6 G/DL (ref 31.5–35.7)
MCV RBC AUTO: 84 FL (ref 79–97)
MONOCYTES # BLD AUTO: 0.59 10*3/MM3 (ref 0.1–0.9)
MONOCYTES NFR BLD AUTO: 4.5 % (ref 5–12)
NEUTROPHILS NFR BLD AUTO: 49.1 % (ref 42.7–76)
NEUTROPHILS NFR BLD AUTO: 6.45 10*3/MM3 (ref 1.7–7)
NT-PROBNP SERPL-MCNC: 44.4 PG/ML (ref 0–450)
PLATELET # BLD AUTO: 393 10*3/MM3 (ref 140–450)
PMV BLD AUTO: 9.6 FL (ref 6–12)
POTASSIUM SERPL-SCNC: 4 MMOL/L (ref 3.5–5.2)
PROT SERPL-MCNC: 7.4 G/DL (ref 6–8.5)
QT INTERVAL: 398 MS
RBC # BLD AUTO: 4.63 10*6/MM3 (ref 3.77–5.28)
SODIUM SERPL-SCNC: 136 MMOL/L (ref 136–145)
TROPONIN T DELTA: NORMAL
TROPONIN T SERPL HS-MCNC: <6 NG/L
WBC NRBC COR # BLD: 13.12 10*3/MM3 (ref 3.4–10.8)

## 2023-07-19 PROCEDURE — 96375 TX/PRO/DX INJ NEW DRUG ADDON: CPT

## 2023-07-19 PROCEDURE — 99284 EMERGENCY DEPT VISIT MOD MDM: CPT

## 2023-07-19 PROCEDURE — 93010 ELECTROCARDIOGRAM REPORT: CPT | Performed by: INTERNAL MEDICINE

## 2023-07-19 PROCEDURE — 93005 ELECTROCARDIOGRAM TRACING: CPT | Performed by: EMERGENCY MEDICINE

## 2023-07-19 PROCEDURE — 25010000002 METOCLOPRAMIDE PER 10 MG: Performed by: EMERGENCY MEDICINE

## 2023-07-19 PROCEDURE — 84484 ASSAY OF TROPONIN QUANT: CPT | Performed by: EMERGENCY MEDICINE

## 2023-07-19 PROCEDURE — 96374 THER/PROPH/DIAG INJ IV PUSH: CPT

## 2023-07-19 PROCEDURE — 25510000001 IOPAMIDOL PER 1 ML: Performed by: EMERGENCY MEDICINE

## 2023-07-19 PROCEDURE — 71275 CT ANGIOGRAPHY CHEST: CPT

## 2023-07-19 PROCEDURE — 80053 COMPREHEN METABOLIC PANEL: CPT | Performed by: EMERGENCY MEDICINE

## 2023-07-19 PROCEDURE — 84703 CHORIONIC GONADOTROPIN ASSAY: CPT | Performed by: EMERGENCY MEDICINE

## 2023-07-19 PROCEDURE — 83880 ASSAY OF NATRIURETIC PEPTIDE: CPT | Performed by: EMERGENCY MEDICINE

## 2023-07-19 PROCEDURE — 71045 X-RAY EXAM CHEST 1 VIEW: CPT

## 2023-07-19 PROCEDURE — 36415 COLL VENOUS BLD VENIPUNCTURE: CPT

## 2023-07-19 PROCEDURE — 83690 ASSAY OF LIPASE: CPT | Performed by: EMERGENCY MEDICINE

## 2023-07-19 PROCEDURE — 85025 COMPLETE CBC W/AUTO DIFF WBC: CPT | Performed by: EMERGENCY MEDICINE

## 2023-07-19 RX ORDER — FAMOTIDINE 10 MG/ML
20 INJECTION, SOLUTION INTRAVENOUS ONCE
Status: COMPLETED | OUTPATIENT
Start: 2023-07-19 | End: 2023-07-19

## 2023-07-19 RX ORDER — ONDANSETRON 4 MG/1
4 TABLET, ORALLY DISINTEGRATING ORAL EVERY 6 HOURS PRN
Qty: 30 TABLET | Refills: 0 | Status: SHIPPED | OUTPATIENT
Start: 2023-07-19

## 2023-07-19 RX ORDER — METOCLOPRAMIDE HYDROCHLORIDE 5 MG/ML
5 INJECTION INTRAMUSCULAR; INTRAVENOUS ONCE
Status: COMPLETED | OUTPATIENT
Start: 2023-07-19 | End: 2023-07-19

## 2023-07-19 RX ORDER — SUCRALFATE 1 G/1
1 TABLET ORAL 4 TIMES DAILY
Qty: 40 TABLET | Refills: 0 | Status: SHIPPED | OUTPATIENT
Start: 2023-07-19

## 2023-07-19 RX ORDER — CEFDINIR 300 MG/1
300 CAPSULE ORAL 2 TIMES DAILY
Qty: 14 CAPSULE | Refills: 0 | Status: SHIPPED | OUTPATIENT
Start: 2023-07-19 | End: 2023-07-26

## 2023-07-19 RX ORDER — SODIUM CHLORIDE 0.9 % (FLUSH) 0.9 %
10 SYRINGE (ML) INJECTION AS NEEDED
Status: DISCONTINUED | OUTPATIENT
Start: 2023-07-19 | End: 2023-07-19 | Stop reason: HOSPADM

## 2023-07-19 RX ADMIN — IOPAMIDOL 70 ML: 755 INJECTION, SOLUTION INTRAVENOUS at 09:43

## 2023-07-19 RX ADMIN — FAMOTIDINE 20 MG: 10 INJECTION INTRAVENOUS at 08:37

## 2023-07-19 RX ADMIN — METOCLOPRAMIDE 5 MG: 5 INJECTION, SOLUTION INTRAMUSCULAR; INTRAVENOUS at 08:37

## 2023-07-19 NOTE — FSED PROVIDER NOTE
Subjective   History of Present Illness  33-year-old female presents complaining of epigastric and chest pain.  Symptoms have been ongoing since last week.  She is unable to describe the quality of the pain.  It seems to start just below her sternum and radiates up into her chest.  She denies SOA but reports hiccups and mild NP cough.  No fevers, travel or sick contacts.  No history of any cardiopulmonary disease or DVT/PE.    History provided by:  Patient and spouse    Review of Systems   Constitutional: Negative.  Negative for fever.   Respiratory:  Positive for cough (and hiccups). Negative for shortness of breath.    Cardiovascular:  Positive for chest pain.   Gastrointestinal:  Positive for abdominal pain (epigastric). Negative for vomiting.   Genitourinary: Negative.  Negative for dysuria.   All other systems reviewed and are negative.    History reviewed. No pertinent past medical history.    No Known Allergies    History reviewed. No pertinent surgical history.    Family History   Problem Relation Age of Onset    Breast cancer Neg Hx     Ovarian cancer Neg Hx     Uterine cancer Neg Hx     Colon cancer Neg Hx     Deep vein thrombosis Neg Hx     Pulmonary embolism Neg Hx        Social History     Socioeconomic History    Marital status:    Tobacco Use    Smoking status: Never    Smokeless tobacco: Never   Substance and Sexual Activity    Alcohol use: Never    Drug use: Never    Sexual activity: Yes     Partners: Male     Birth control/protection: None           Objective   Physical Exam  Vitals and nursing note reviewed.   Constitutional:       General: She is not in acute distress.     Appearance: She is not diaphoretic.   HENT:      Head: Normocephalic and atraumatic.      Right Ear: External ear normal.      Left Ear: External ear normal.      Nose: Nose normal.   Eyes:      Pupils: Pupils are equal, round, and reactive to light.   Cardiovascular:      Rate and Rhythm: Normal rate and regular rhythm.       Heart sounds: Normal heart sounds.   Pulmonary:      Effort: Pulmonary effort is normal. No respiratory distress.      Breath sounds: Normal breath sounds.   Abdominal:      Palpations: Abdomen is soft.      Tenderness: There is no abdominal tenderness.   Musculoskeletal:         General: No swelling or tenderness. Normal range of motion.      Cervical back: Normal range of motion and neck supple.   Skin:     General: Skin is warm and dry.      Findings: No rash.   Neurological:      General: No focal deficit present.      Mental Status: She is alert and oriented to person, place, and time.   Psychiatric:         Mood and Affect: Mood normal.         Behavior: Behavior normal.       Procedures       CT Angiogram Chest Pulmonary Embolism   Final Result   1.  No definitive central pulmonary embolus on this somewhat motion   limited exam.   2.  Small bilateral pleural effusions with suggestion of interstitial   edema. Recommend follow-up to resolution after treatment.   3.  Enhancing 4.2 cm hepatic mass favoring focal nodular hyperplasia   consider MRI Eovist for further characterization. At this time   additional hypodense liver lesions favoring cysts and/or hemangiomas can   also better be characterized.   4.  Please see above for additional findings/recommendations.       This report was finalized on 7/19/2023 9:59 AM by Dr. Balwinder Delgado M.D.          XR Chest 1 View   Final Result   As described.       This report was finalized on 7/19/2023 8:39 AM by Dr. Camilo Castillo M.D.                ED Course  ED Course as of 07/19/23 1309   Wed Jul 19, 2023   0802 EKG: Normal sinus rhythm, rate 71, , QRS 90, QTc 433, no STEMI. [DH]      ED Course User Index  [DH] Luis Parr MD                      HEART Score: 0                      Medical Decision Making  Number and Complexity of Problems  Differential Diagnosis: ACS, anxiety, GERD, MSK, infectious, PE    MDM Data  External documents  reviewed: recent note  My Laboratory interpretation: Mild leukocytosis, chemistry unremarkable, troponin WNL  Radiology interpretation: see reports    Treatment and Disposition  ED Course: 33-year-old female presenting with symptoms as described.  She is well-appearing with a benign exam and symptoms that honestly sound like GERD.  She has had improvement with meds here.  EKG and troponin were nonischemic x2.  I did not initially consider PE since she is PERC negative, but given the abnormal chest x-ray, CTA of the chest was obtained which revealed interstitial findings and small bilateral effusions.  No PE and patient has no evidence for CHF.  I suspect this may be infectious, will treat with cefdinir given that she recently finished a course of azithromycin.  I explained that she will need to follow-up with her primary care physician for further evaluation and repeat testing.  I also relayed the findings of an indeterminate liver mass which will require MRI to further characterize and rule out cancer.  All questions were answered.    Shared decision making: discharge    Problems Addressed:  Acute bacterial bronchitis: complicated acute illness or injury  Chest pain, unspecified type: complicated acute illness or injury  Gastroesophageal reflux disease, unspecified whether esophagitis present: complicated acute illness or injury  Liver mass: complicated acute illness or injury    Amount and/or Complexity of Data Reviewed  Independent Historian: spouse  External Data Reviewed: notes.  Labs: ordered.  Radiology: ordered and independent interpretation performed.  ECG/medicine tests: ordered and independent interpretation performed.    Risk  Prescription drug management.  Decision regarding hospitalization.        Final diagnoses:   Chest pain, unspecified type   Gastroesophageal reflux disease, unspecified whether esophagitis present   Acute bacterial bronchitis   Liver mass       ED Disposition  ED Disposition        ED Disposition   Discharge    Condition   Stable    Comment   --               PATIENT CONNECTION - [place] [id number]  [phone]  Schedule an appointment as soon as possible for a visit            Medication List        New Prescriptions      cefdinir 300 MG capsule  Commonly known as: OMNICEF  Take 1 capsule by mouth 2 (Two) Times a Day for 7 days.     ondansetron ODT 4 MG disintegrating tablet  Commonly known as: ZOFRAN-ODT  Place 1 tablet on the tongue Every 6 (Six) Hours As Needed for Nausea or Vomiting.     sucralfate 1 g tablet  Commonly known as: CARAFATE  Take 1 tablet by mouth 4 (Four) Times a Day.               Where to Get Your Medications        These medications were sent to [facility] [address] [facility] - [place], [place] - [address] [facility] [address] - [phone]  - [phone]   [address] [address] [address]      Phone: [phone]   cefdinir 300 MG capsule  ondansetron ODT 4 MG disintegrating tablet  sucralfate 1 g tablet

## 2023-07-19 NOTE — DISCHARGE INSTRUCTIONS
Continue to take the omeprazole that you were already prescribed.    Follow up with your regular doctor for an MRI to further evaluate the mass that was seen in your liver today.

## 2023-08-05 ENCOUNTER — HOSPITAL ENCOUNTER (EMERGENCY)
Facility: HOSPITAL | Age: 33
Discharge: HOME | End: 2023-08-05
Attending: EMERGENCY MEDICINE
Payer: COMMERCIAL

## 2023-08-05 ENCOUNTER — APPOINTMENT (OUTPATIENT)
Dept: GENERAL RADIOLOGY | Facility: HOSPITAL | Age: 33
End: 2023-08-05
Payer: COMMERCIAL

## 2023-08-05 VITALS
HEART RATE: 75 BPM | TEMPERATURE: 97.6 F | WEIGHT: 170 LBS | SYSTOLIC BLOOD PRESSURE: 111 MMHG | BODY MASS INDEX: 27.32 KG/M2 | RESPIRATION RATE: 20 BRPM | DIASTOLIC BLOOD PRESSURE: 79 MMHG | OXYGEN SATURATION: 100 % | HEIGHT: 66 IN

## 2023-08-05 DIAGNOSIS — R05.2 SUBACUTE COUGH: ICD-10-CM

## 2023-08-05 DIAGNOSIS — R06.00 DYSPNEA, UNSPECIFIED TYPE: Primary | ICD-10-CM

## 2023-08-05 DIAGNOSIS — K21.9 GASTROESOPHAGEAL REFLUX DISEASE, UNSPECIFIED WHETHER ESOPHAGITIS PRESENT: ICD-10-CM

## 2023-08-05 LAB
ALBUMIN SERPL-MCNC: 3.9 G/DL (ref 3.5–5.2)
ALBUMIN/GLOB SERPL: 1.2 G/DL
ALP SERPL-CCNC: 92 U/L (ref 39–117)
ALT SERPL W P-5'-P-CCNC: 14 U/L (ref 1–33)
ANION GAP SERPL CALCULATED.3IONS-SCNC: 5.8 MMOL/L (ref 5–15)
AST SERPL-CCNC: 13 U/L (ref 1–32)
BASOPHILS # BLD AUTO: 0.01 10*3/MM3 (ref 0–0.2)
BASOPHILS NFR BLD AUTO: 0.1 % (ref 0–1.5)
BILIRUB SERPL-MCNC: 0.4 MG/DL (ref 0–1.2)
BUN SERPL-MCNC: 5 MG/DL (ref 6–20)
BUN/CREAT SERPL: 10 (ref 7–25)
CALCIUM SPEC-SCNC: 9.5 MG/DL (ref 8.6–10.5)
CHLORIDE SERPL-SCNC: 108 MMOL/L (ref 98–107)
CO2 SERPL-SCNC: 26.2 MMOL/L (ref 22–29)
CREAT SERPL-MCNC: 0.5 MG/DL (ref 0.57–1)
DEPRECATED RDW RBC AUTO: 40.2 FL (ref 37–54)
EGFRCR SERPLBLD CKD-EPI 2021: 128 ML/MIN/1.73
EOSINOPHIL # BLD AUTO: 0.06 10*3/MM3 (ref 0–0.4)
EOSINOPHIL NFR BLD AUTO: 0.9 % (ref 0.3–6.2)
ERYTHROCYTE [DISTWIDTH] IN BLOOD BY AUTOMATED COUNT: 13.1 % (ref 12.3–15.4)
GLOBULIN UR ELPH-MCNC: 3.2 GM/DL
GLUCOSE SERPL-MCNC: 90 MG/DL (ref 65–99)
HCG SERPL QL: NEGATIVE
HCT VFR BLD AUTO: 35.9 % (ref 34–46.6)
HGB BLD-MCNC: 11.7 G/DL (ref 12–15.9)
IMM GRANULOCYTES # BLD AUTO: 0.01 10*3/MM3 (ref 0–0.05)
IMM GRANULOCYTES NFR BLD AUTO: 0.1 % (ref 0–0.5)
LIPASE SERPL-CCNC: 24 U/L (ref 13–60)
LYMPHOCYTES # BLD AUTO: 2.88 10*3/MM3 (ref 0.7–3.1)
LYMPHOCYTES NFR BLD AUTO: 42.9 % (ref 19.6–45.3)
MCH RBC QN AUTO: 27.6 PG (ref 26.6–33)
MCHC RBC AUTO-ENTMCNC: 32.6 G/DL (ref 31.5–35.7)
MCV RBC AUTO: 84.7 FL (ref 79–97)
MONOCYTES # BLD AUTO: 0.39 10*3/MM3 (ref 0.1–0.9)
MONOCYTES NFR BLD AUTO: 5.8 % (ref 5–12)
NEUTROPHILS NFR BLD AUTO: 3.36 10*3/MM3 (ref 1.7–7)
NEUTROPHILS NFR BLD AUTO: 50.2 % (ref 42.7–76)
NRBC BLD AUTO-RTO: 0 /100 WBC (ref 0–0.2)
PLATELET # BLD AUTO: 384 10*3/MM3 (ref 140–450)
PMV BLD AUTO: 9.8 FL (ref 6–12)
POTASSIUM SERPL-SCNC: 4.3 MMOL/L (ref 3.5–5.2)
PROT SERPL-MCNC: 7.1 G/DL (ref 6–8.5)
QT INTERVAL: 402 MS
RBC # BLD AUTO: 4.24 10*6/MM3 (ref 3.77–5.28)
SODIUM SERPL-SCNC: 140 MMOL/L (ref 136–145)
TROPONIN T SERPL HS-MCNC: <6 NG/L
WBC NRBC COR # BLD: 6.71 10*3/MM3 (ref 3.4–10.8)

## 2023-08-05 PROCEDURE — 25010000002 DIPHENHYDRAMINE PER 50 MG: Performed by: EMERGENCY MEDICINE

## 2023-08-05 PROCEDURE — 85025 COMPLETE CBC W/AUTO DIFF WBC: CPT | Performed by: EMERGENCY MEDICINE

## 2023-08-05 PROCEDURE — 99284 EMERGENCY DEPT VISIT MOD MDM: CPT

## 2023-08-05 PROCEDURE — 93010 ELECTROCARDIOGRAM REPORT: CPT | Performed by: INTERNAL MEDICINE

## 2023-08-05 PROCEDURE — 96375 TX/PRO/DX INJ NEW DRUG ADDON: CPT

## 2023-08-05 PROCEDURE — 83690 ASSAY OF LIPASE: CPT | Performed by: EMERGENCY MEDICINE

## 2023-08-05 PROCEDURE — 84703 CHORIONIC GONADOTROPIN ASSAY: CPT | Performed by: EMERGENCY MEDICINE

## 2023-08-05 PROCEDURE — 96374 THER/PROPH/DIAG INJ IV PUSH: CPT

## 2023-08-05 PROCEDURE — 93005 ELECTROCARDIOGRAM TRACING: CPT | Performed by: EMERGENCY MEDICINE

## 2023-08-05 PROCEDURE — 84484 ASSAY OF TROPONIN QUANT: CPT | Performed by: EMERGENCY MEDICINE

## 2023-08-05 PROCEDURE — 80053 COMPREHEN METABOLIC PANEL: CPT | Performed by: EMERGENCY MEDICINE

## 2023-08-05 PROCEDURE — 71045 X-RAY EXAM CHEST 1 VIEW: CPT

## 2023-08-05 PROCEDURE — 25010000002 METOCLOPRAMIDE PER 10 MG: Performed by: EMERGENCY MEDICINE

## 2023-08-05 RX ORDER — SODIUM CHLORIDE 0.9 % (FLUSH) 0.9 %
10 SYRINGE (ML) INJECTION AS NEEDED
Status: DISCONTINUED | OUTPATIENT
Start: 2023-08-05 | End: 2023-08-05 | Stop reason: HOSPADM

## 2023-08-05 RX ORDER — PANTOPRAZOLE SODIUM 40 MG/10ML
40 INJECTION, POWDER, LYOPHILIZED, FOR SOLUTION INTRAVENOUS ONCE
Status: COMPLETED | OUTPATIENT
Start: 2023-08-05 | End: 2023-08-05

## 2023-08-05 RX ORDER — METOCLOPRAMIDE HYDROCHLORIDE 5 MG/ML
10 INJECTION INTRAMUSCULAR; INTRAVENOUS ONCE
Status: COMPLETED | OUTPATIENT
Start: 2023-08-05 | End: 2023-08-05

## 2023-08-05 RX ORDER — FAMOTIDINE 20 MG/1
20 TABLET, FILM COATED ORAL 2 TIMES DAILY
Qty: 60 TABLET | Refills: 0 | Status: SHIPPED | OUTPATIENT
Start: 2023-08-05

## 2023-08-05 RX ORDER — DIPHENHYDRAMINE HYDROCHLORIDE 50 MG/ML
25 INJECTION INTRAMUSCULAR; INTRAVENOUS ONCE
Status: COMPLETED | OUTPATIENT
Start: 2023-08-05 | End: 2023-08-05

## 2023-08-05 RX ADMIN — PANTOPRAZOLE SODIUM 40 MG: 40 INJECTION, POWDER, FOR SOLUTION INTRAVENOUS at 06:21

## 2023-08-05 RX ADMIN — DIPHENHYDRAMINE HYDROCHLORIDE 25 MG: 50 INJECTION, SOLUTION INTRAMUSCULAR; INTRAVENOUS at 06:22

## 2023-08-05 RX ADMIN — METOCLOPRAMIDE 10 MG: 5 INJECTION, SOLUTION INTRAMUSCULAR; INTRAVENOUS at 06:22

## 2023-08-05 NOTE — DISCHARGE INSTRUCTIONS
Return to the emergency department with worsening symptoms, uncontrolled pain, inability to tolerate oral liquids, fever greater than 105øF not controlled by Tylenol and ibuprofen or as needed with emergent concerns.

## 2023-08-05 NOTE — ED PROVIDER NOTES
EMERGENCY DEPARTMENT ENCOUNTER    Room Number:  13/13  Date of encounter:  8/5/2023  PCP: Provider, No Known  Patient Care Team:  Provider, No Known as PCP - General   Independent Historians: Patient using  as Adriana             HPI:  Chief Complaint: Shortness of breath  A complete HPI/ROS/PMH/PSH/SH/FH are unobtainable due to: Nothing    Chronic or social conditions impacting patient care (social determinants of health): Nothing    Context: Kristen Mota is a 33 y.o. female who arrives to the ED via EMS from home.  Patient presents with c/o multiple complaints including intermittent shortness of breath, epigastric pain, shakes for the past 2 weeks.   states that patient has been told that she has ulcers, she is scheduled for what sounds like an EGD later this month.     Patient denies fever, chills, nausea, vomiting, dysuria.  Patient states that nothing makes the symptoms better and sometimes eating worsens symptoms.    Last menstrual period 7/26/2023      Review of prior external notes (non-ED): Medical records reviewed in [facility], patient last had an annual exam with her OB/GYN 3/17/2021.    Review of prior external test results outside of this encounter: CTA chest and CT abdomen and pelvis performed 8/1/2023 showed no evidence of PE, no sign of aortic dissection, no pulmonary edema or infiltrate.  Multiple hepatic cyst, otherwise unremarkable CT of the abdomen and pelvis.      PAST MEDICAL HISTORY  Active Ambulatory Problems     Diagnosis Date Noted    No Active Ambulatory Problems     Resolved Ambulatory Problems     Diagnosis Date Noted    No Resolved Ambulatory Problems     No Additional Past Medical History       The patient has started, but not completed, their COVID-19 vaccination series.    PAST SURGICAL HISTORY  No past surgical history on file.      FAMILY HISTORY  Family History   Problem Relation Age of Onset    Breast cancer Neg Hx     Ovarian cancer Neg Hx      Uterine cancer Neg Hx     Colon cancer Neg Hx     Deep vein thrombosis Neg Hx     Pulmonary embolism Neg Hx          SOCIAL HISTORY  Social History     Socioeconomic History    Marital status:    Tobacco Use    Smoking status: Never    Smokeless tobacco: Never   Substance and Sexual Activity    Alcohol use: Never    Drug use: Never    Sexual activity: Yes     Partners: Male     Birth control/protection: None         ALLERGIES  Patient has no known allergies.        REVIEW OF SYSTEMS  Review of Systems     All systems reviewed and negative except for those discussed in HPI.       PHYSICAL EXAM    I have reviewed the triage vital signs and nursing notes.    ED Triage Vitals [08/05/23 0506]   Temp Heart Rate Resp BP SpO2   97.6 øF (36.4 øC) 60 20 135/86 100 %       Physical Exam  GENERAL: Well appearing, nontoxic appearing, not distressed, speaking in full sentences, handling secretions  HENT: normocephalic, atraumatic  EYES: no scleral icterus, PERRL  CV: regular rhythm, regular rate, no murmur  RESPIRATORY: normal effort, CTAB  ABDOMEN: soft, normal bowel sounds, nontender  MUSCULOSKELETAL: no deformity  NEURO: alert, moves all extremities, follows commands, mental status normal/baseline  SKIN: warm, dry, no rash   Psych: Appropriate mood and affect  Nursing notes and vital signs reviewed          LAB RESULTS  Recent Results (from the past 24 hour(s))   ECG 12 Lead Chest Pain    Collection Time: 08/05/23  6:01 AM   Result Value Ref Range    QT Interval 402 ms   Comprehensive Metabolic Panel    Collection Time: 08/05/23  6:07 AM    Specimen: Blood   Result Value Ref Range    Glucose 90 65 - 99 mg/dL    BUN 5 (L) 6 - 20 mg/dL    Creatinine 0.50 (L) 0.57 - 1.00 mg/dL    Sodium 140 136 - 145 mmol/L    Potassium 4.3 3.5 - 5.2 mmol/L    Chloride 108 (H) 98 - 107 mmol/L    CO2 26.2 22.0 - 29.0 mmol/L    Calcium 9.5 8.6 - 10.5 mg/dL    Total Protein 7.1 6.0 - 8.5 g/dL    Albumin 3.9 3.5 - 5.2 g/dL    ALT (SGPT) 14 1 -  33 U/L    AST (SGOT) 13 1 - 32 U/L    Alkaline Phosphatase 92 39 - 117 U/L    Total Bilirubin 0.4 0.0 - 1.2 mg/dL    Globulin 3.2 gm/dL    A/G Ratio 1.2 g/dL    BUN/Creatinine Ratio 10.0 7.0 - 25.0    Anion Gap 5.8 5.0 - 15.0 mmol/L    eGFR 128.0 >60.0 mL/min/1.73   Lipase    Collection Time: 08/05/23  6:07 AM    Specimen: Blood   Result Value Ref Range    Lipase 24 13 - 60 U/L   hCG, Serum, Qualitative    Collection Time: 08/05/23  6:07 AM    Specimen: Blood   Result Value Ref Range    HCG Qualitative Negative Negative   Single High Sensitivity Troponin T    Collection Time: 08/05/23  6:07 AM    Specimen: Blood   Result Value Ref Range    HS Troponin T <6 <10 ng/L   CBC Auto Differential    Collection Time: 08/05/23  6:07 AM    Specimen: Blood   Result Value Ref Range    WBC 6.71 3.40 - 10.80 10*3/mm3    RBC 4.24 3.77 - 5.28 10*6/mm3    Hemoglobin 11.7 (L) 12.0 - 15.9 g/dL    Hematocrit 35.9 34.0 - 46.6 %    MCV 84.7 79.0 - 97.0 fL    MCH 27.6 26.6 - 33.0 pg    MCHC 32.6 31.5 - 35.7 g/dL    RDW 13.1 12.3 - 15.4 %    RDW-SD 40.2 37.0 - 54.0 fl    MPV 9.8 6.0 - 12.0 fL    Platelets 384 140 - 450 10*3/mm3    Neutrophil % 50.2 42.7 - 76.0 %    Lymphocyte % 42.9 19.6 - 45.3 %    Monocyte % 5.8 5.0 - 12.0 %    Eosinophil % 0.9 0.3 - 6.2 %    Basophil % 0.1 0.0 - 1.5 %    Immature Grans % 0.1 0.0 - 0.5 %    Neutrophils, Absolute 3.36 1.70 - 7.00 10*3/mm3    Lymphocytes, Absolute 2.88 0.70 - 3.10 10*3/mm3    Monocytes, Absolute 0.39 0.10 - 0.90 10*3/mm3    Eosinophils, Absolute 0.06 0.00 - 0.40 10*3/mm3    Basophils, Absolute 0.01 0.00 - 0.20 10*3/mm3    Immature Grans, Absolute 0.01 0.00 - 0.05 10*3/mm3    nRBC 0.0 0.0 - 0.2 /100 WBC       Ordered the above labs and independently reviewed the results.        RADIOLOGY  XR Chest 1 View    Result Date: 8/5/2023  AP CHEST  HISTORY: Chest pain  COMPARISON: 07/19/2023  FINDINGS: Shallow inspiratory effort with mild atelectasis in the lung bases. Heart size stable. No  pneumothorax.      Mild bibasilar atelectasis  This report was finalized on 8/5/2023 6:32 AM by Dr. Leonard Shelton M.D.       I ordered the above noted radiological studies. Reviewed by me and discussed with radiologist.  See dictation for official radiology interpretation.      PROCEDURES    Procedures    DIFFERENTIAL DIAGNOSIS:  Differential diagnoses include but are not limited to the following: Epigastric pain, peptic ulcer, dysphagia,    PROGRESS, DATA ANALYSIS, CONSULTS, AND MEDICAL DECISION MAKING    All labs have been independently reviewed by me.  All radiology studies have been reviewed by me and discussed with radiologist dictating the report.   EKG's independently viewed and interpreted by me.  Discussion below represents my analysis of pertinent findings related to patient's condition, differential diagnosis, treatment plan and final disposition.        ED Course as of 08/05/23 0935   Sat Aug 05, 2023   0529 First look: Patient presents with hiccups for the past couple weeks.  Reports she feels short of breath and has some chest pain with them.  Denies any abdominal pain.  Reports some shortness of breath.  Has been told she has ulcers in the past.  No fevers or chills. [TJ]   4030 Reviewed pt's history and workup with Dr. Shelton.  After a bedside evaluation, he agrees with the plan of care.  Care transferred to him at this time.     [MS]      ED Course User Index  [MS] Noemi Handy, APRN  [TJ] Gorge Esquivel MD         DISPOSITION  ED Disposition       ED Disposition   Discharge    Condition   Stable    Comment   --                 AS OF 09:35 EDT VITALS:    BP - 111/79  HR - 75  TEMP - 97.6 øF (36.4 øC) (Tympanic)  O2 SATS - 100%      MEDICATIONS GIVEN IN ER    Medications   sodium chloride 0.9 % flush 10 mL (has no administration in time range)   metoclopramide (REGLAN) injection 10 mg (10 mg Intravenous Given 8/5/23 0622)   diphenhydrAMINE (BENADRYL) injection 25 mg (25 mg  Intravenous Given 8/5/23 0622)   pantoprazole (PROTONIX) injection 40 mg (40 mg Intravenous Given 8/5/23 0621)                Note Disclaimer: At [facility], we believe that sharing information builds trust and better relationships. You are receiving this note because you recently visited [facility]. It is possible you will see health information before a provider has talked with you about it. This kind of information can be easy to misunderstand. To help you fully understand what it means for your health, we urge you to discuss this note with your provider.         Noemi Handy, APRN  08/05/23 0983

## 2023-08-05 NOTE — ED PROVIDER NOTES
Brief history of present illness: 33-year-old female who has been having episodes of reflux and feels like something in her throat and then has cough and shortness of breath when she has severe episodes comes again to the emergency department for further evaluation.  She reports she was seen recently at another facility for same.  At some point she has been started on pantoprazole but is only been on it for about 2 weeks.  She has gastroenterology appointment scheduled.  She is just worried that symptoms keep recurring and have not yet resolved.    Physical exam:     ED Triage Vitals [08/05/23 0506]   Temp Heart Rate Resp BP SpO2   97.6 øF (36.4 øC) 60 20 135/86 100 %      Temp src Heart Rate Source Patient Position BP Location FiO2 (%)   Tympanic Monitor -- -- --       Physical Exam   Constitutional: No distress.  Nontoxic  HENT:  Head: Normocephalic and atraumatic.   Oropharynx: Mucous membranes are moist.   Eyes: . No scleral icterus. No conjunctival pallor.  Neck: Normal range of motion. Neck supple.   Cardiovascular: Pink warm and well perfused throughout.    Pulmonary/Chest: No respiratory distress.  No tachypnea or increased work of breathing appreciated.    Abdominal: Soft. There is no tenderness. There is no rebound and no guarding.   Musculoskeletal: Moves all extremities equally.    Neurological: Alert and follows simple exam requests  Skin: Skin is pink, warm, and dry.   Psychiatric: Mood and affect normal.   Nursing note and vitals reviewed.      MDM:      My differential diagnosis for dyspnea includes but is not limited to:  Asthma, COPD, pneumonia, pulmonary embolus, acute respiratory distress syndrome, pneumothorax, pleural effusion, pulmonary fibrosis, congestive heart failure, myocardial infarction, DKA, uremia, acidosis, sepsis, anemia, drug related, hyperventilation, CNS disease    CT angio chest 8/1/2023 results reviewed showing no evidence of thromboembolic disease within the pulmonary  arteries and no evidence of aortic dissection.  Trace pleural effusion seems bilaterally with no pulmonary edema or infiltrate.    I have personally reviewed and interpreted the EKG obtained today in the emergency department at 0601 concomitant with treatment.  EKG reveals rhythm/rate -sinus, 70. VT-MARCIA within normal limits.  QRS-narrow complex ST/T-wave -no STEMI; QTc within normal limits    RADIOLOGY      Study: Single view chest  Findings: No pneumothorax or focal infiltrate appreciated  I independently viewed and interpreted these images contemporaneously with treatment.     I have seen and personally evaluated this patient, discussed the case with the treating advanced practice provider, and reviewed their note. I was involved in the medical decision making during the evaluation, testing and disposition planning for this patient.    Results for orders placed or performed during the hospital encounter of 08/05/23   Comprehensive Metabolic Panel    Specimen: Blood   Result Value Ref Range    Glucose 90 65 - 99 mg/dL    BUN 5 (L) 6 - 20 mg/dL    Creatinine 0.50 (L) 0.57 - 1.00 mg/dL    Sodium 140 136 - 145 mmol/L    Potassium 4.3 3.5 - 5.2 mmol/L    Chloride 108 (H) 98 - 107 mmol/L    CO2 26.2 22.0 - 29.0 mmol/L    Calcium 9.5 8.6 - 10.5 mg/dL    Total Protein 7.1 6.0 - 8.5 g/dL    Albumin 3.9 3.5 - 5.2 g/dL    ALT (SGPT) 14 1 - 33 U/L    AST (SGOT) 13 1 - 32 U/L    Alkaline Phosphatase 92 39 - 117 U/L    Total Bilirubin 0.4 0.0 - 1.2 mg/dL    Globulin 3.2 gm/dL    A/G Ratio 1.2 g/dL    BUN/Creatinine Ratio 10.0 7.0 - 25.0    Anion Gap 5.8 5.0 - 15.0 mmol/L    eGFR 128.0 >60.0 mL/min/1.73   Lipase    Specimen: Blood   Result Value Ref Range    Lipase 24 13 - 60 U/L   hCG, Serum, Qualitative    Specimen: Blood   Result Value Ref Range    HCG Qualitative Negative Negative   Single High Sensitivity Troponin T    Specimen: Blood   Result Value Ref Range    HS Troponin T <6 <10 ng/L   CBC Auto Differential    Specimen:  Blood   Result Value Ref Range    WBC 6.71 3.40 - 10.80 10*3/mm3    RBC 4.24 3.77 - 5.28 10*6/mm3    Hemoglobin 11.7 (L) 12.0 - 15.9 g/dL    Hematocrit 35.9 34.0 - 46.6 %    MCV 84.7 79.0 - 97.0 fL    MCH 27.6 26.6 - 33.0 pg    MCHC 32.6 31.5 - 35.7 g/dL    RDW 13.1 12.3 - 15.4 %    RDW-SD 40.2 37.0 - 54.0 fl    MPV 9.8 6.0 - 12.0 fL    Platelets 384 140 - 450 10*3/mm3    Neutrophil % 50.2 42.7 - 76.0 %    Lymphocyte % 42.9 19.6 - 45.3 %    Monocyte % 5.8 5.0 - 12.0 %    Eosinophil % 0.9 0.3 - 6.2 %    Basophil % 0.1 0.0 - 1.5 %    Immature Grans % 0.1 0.0 - 0.5 %    Neutrophils, Absolute 3.36 1.70 - 7.00 10*3/mm3    Lymphocytes, Absolute 2.88 0.70 - 3.10 10*3/mm3    Monocytes, Absolute 0.39 0.10 - 0.90 10*3/mm3    Eosinophils, Absolute 0.06 0.00 - 0.40 10*3/mm3    Basophils, Absolute 0.01 0.00 - 0.20 10*3/mm3    Immature Grans, Absolute 0.01 0.00 - 0.05 10*3/mm3    nRBC 0.0 0.0 - 0.2 /100 WBC   ECG 12 Lead Chest Pain   Result Value Ref Range    QT Interval 402 ms         Progress:  0745: Patient seems to be complaining of some dyspnea that is intermittent and feels like she needs to cough when it happens.  Patient likely experiencing reflux disease causing some underlying bronchitis from recurrent acid exposure.  Patient appears to be on Carafate but will certainly need H2 blocker added to this as well as gastroenterology outpatient follow-up.  Patient and spouse updated with all findings and agreeable discharge plan.    Final diagnoses:   Dyspnea, unspecified type   Subacute cough   Gastroesophageal reflux disease, unspecified whether esophagitis present       Disposition:  DISCHARGE    Patient discharged in stable condition.    Reviewed implications of results, diagnosis, meds, responsibility to follow up, warning signs and symptoms of possible worsening, potential complications and reasons to return to ER.    Patient/Family voiced understanding of above instructions.    Discussed plan for discharge, as there is  no emergent indication for admission. Patient referred to primary care provider for regular health maintenance. Pt/family is agreeable and understands need for follow up and possible repeat testing.  Pt is aware that discharge does not mean that nothing is wrong but it indicates no emergency is present that requires admission and they must continue care with follow-up as given below or physician of their choice.     FOLLOW-UP  Your primary care provider    Schedule an appointment as soon as possible for a visit   As needed    Hugh Mcfarland MD  [address] [id number] [phone]    Call in 2 days  Schedule outpatient follow-up         Medication List        New Prescriptions      famotidine 20 MG tablet  Commonly known as: PEPCID  Take 1 tablet by mouth 2 (Two) Times a Day.               Where to Get Your Medications        These medications were sent to [place] [address] [facility] - [place], [place] - [address] [facility] [address] - [phone]  - [phone]   [address] [facility] [address], [facility] [address]      Phone: [phone]   famotidine 20 MG tablet            Mariusz Shelton MD  08/05/23 7577

## 2023-08-05 NOTE — ED TRIAGE NOTES
Patient to ED via EMS stretcher from home with complaints of throat pain that began around  midnight. EMS reports that patient says there feels like something in her throat. Patient recently seen and diagnosed with an ulcer, denies nausea/vomiting.  Pt AA&Ox4 at time of triage.

## 2023-08-07 ENCOUNTER — HOSPITAL ENCOUNTER (EMERGENCY)
Facility: HOSPITAL | Age: 33
Discharge: HOME | End: 2023-08-07
Attending: STUDENT IN AN ORGANIZED HEALTH CARE EDUCATION/TRAINING PROGRAM | Admitting: STUDENT IN AN ORGANIZED HEALTH CARE EDUCATION/TRAINING PROGRAM
Payer: COMMERCIAL

## 2023-08-07 VITALS
OXYGEN SATURATION: 97 % | SYSTOLIC BLOOD PRESSURE: 123 MMHG | TEMPERATURE: 97.8 F | BODY MASS INDEX: 27.32 KG/M2 | HEART RATE: 66 BPM | RESPIRATION RATE: 14 BRPM | HEIGHT: 66 IN | WEIGHT: 170 LBS | DIASTOLIC BLOOD PRESSURE: 77 MMHG

## 2023-08-07 DIAGNOSIS — R06.6 INTRACTABLE HICCUPS: Primary | ICD-10-CM

## 2023-08-07 PROCEDURE — 99283 EMERGENCY DEPT VISIT LOW MDM: CPT

## 2023-08-07 PROCEDURE — 93005 ELECTROCARDIOGRAM TRACING: CPT | Performed by: STUDENT IN AN ORGANIZED HEALTH CARE EDUCATION/TRAINING PROGRAM

## 2023-08-07 RX ORDER — CYCLOBENZAPRINE HCL 10 MG
10 TABLET ORAL ONCE
Status: COMPLETED | OUTPATIENT
Start: 2023-08-07 | End: 2023-08-07

## 2023-08-07 RX ORDER — BACLOFEN 10 MG/1
10 TABLET ORAL 3 TIMES DAILY PRN
Qty: 30 TABLET | Refills: 0 | Status: SHIPPED | OUTPATIENT
Start: 2023-08-07

## 2023-08-07 RX ADMIN — CYCLOBENZAPRINE HYDROCHLORIDE 10 MG: 10 TABLET, FILM COATED ORAL at 21:50

## 2023-08-08 LAB — QT INTERVAL: 383 MS

## 2023-08-08 NOTE — DISCHARGE INSTRUCTIONS
The following can help with hiccups    Breath holding for 5 to 10 seconds (or as tolerated).  ?Performing Valsalva maneuver, holding for five seconds.  ?Sipping on or gargling with very cold water.  ?Biting into a lemon.  ?Pulling on the tongue.  ?Swallowing a teaspoon of dry sugar.  ?Pressing gently but firmly on the eyeballs.  ?While sitting, pulling the knees up to the chest (or leaning forward to compress the chest); hold the position for 30 seconds to one minute if possible.

## 2023-08-08 NOTE — FSED PROVIDER NOTE
Subjective   History of Present Illness  34 yo F presents to the ED with hiccups. She reports these have been going on for several weeks. She has been seen in the ED multiple times and had full workups to exclude cardiac etiology and PE. She has been taking pepcid and pantoprazole with no relief. She states the hiccups occasionally make her dyspneic or lightheaded but no other symptoms.    History provided by:  Patient    Review of Systems   Constitutional:  Negative for chills and fever.        Hiccups   HENT:  Negative for congestion and sore throat.    Eyes:  Negative for pain and redness.   Respiratory:  Negative for cough and shortness of breath.    Cardiovascular:  Negative for chest pain and palpitations.   Gastrointestinal:  Negative for abdominal pain, nausea and vomiting.   Genitourinary:  Negative for difficulty urinating and dysuria.   Musculoskeletal:  Negative for back pain and neck pain.   Skin:  Negative for rash and wound.   Neurological:  Negative for weakness, numbness and headaches.   All other systems reviewed and are negative.    History reviewed. No pertinent past medical history.    No Known Allergies    History reviewed. No pertinent surgical history.    Family History   Problem Relation Age of Onset    Breast cancer Neg Hx     Ovarian cancer Neg Hx     Uterine cancer Neg Hx     Colon cancer Neg Hx     Deep vein thrombosis Neg Hx     Pulmonary embolism Neg Hx        Social History     Socioeconomic History    Marital status:    Tobacco Use    Smoking status: Never    Smokeless tobacco: Never   Substance and Sexual Activity    Alcohol use: Never    Drug use: Never    Sexual activity: Yes     Partners: Male     Birth control/protection: None           Objective   Physical Exam  Vitals and nursing note reviewed.   Constitutional:       General: She is not in acute distress.     Appearance: Normal appearance.      Comments: Frequent hiccups   HENT:      Head: Normocephalic and atraumatic.       Mouth/Throat:      Mouth: Mucous membranes are moist.   Eyes:      Extraocular Movements: Extraocular movements intact.      Conjunctiva/sclera: Conjunctivae normal.      Pupils: Pupils are equal, round, and reactive to light.   Cardiovascular:      Rate and Rhythm: Normal rate and regular rhythm.      Pulses: Normal pulses.      Heart sounds: Normal heart sounds.   Pulmonary:      Effort: Pulmonary effort is normal. No respiratory distress.      Breath sounds: Normal breath sounds.   Abdominal:      General: There is no distension.      Palpations: Abdomen is soft.      Tenderness: There is no abdominal tenderness.   Musculoskeletal:         General: Normal range of motion.      Cervical back: Normal range of motion and neck supple.   Skin:     General: Skin is warm.   Neurological:      General: No focal deficit present.      Mental Status: She is alert.   Psychiatric:         Mood and Affect: Mood normal.         Behavior: Behavior normal.       Procedures           ED Course                                           Medical Decision Making  32 yo F presents with hiccups. Full workups have been done in the ED prior regardiac cardiac and PE etiology, which were negative. EKG uremarkable today. Will rx baclofen. Patient reports improvement in symptoms after their stay in the emergency department.  Patient states they are comfortable with being discharged home.  They have been counseled to follow-up closely with her primary care physician.  Strict return precautions to return back to the ED at anytime for any new or worsening symptoms.  Patient expressed understanding and agreement with this plan.  Patient discharged home.      Problems Addressed:  Intractable hiccups: complicated acute illness or injury    Amount and/or Complexity of Data Reviewed  ECG/medicine tests: ordered.     Details: Rate 69. NSR, Normal axis, no STEMI    Risk  Prescription drug management.        Final diagnoses:   Intractable hiccups        ED Disposition  ED Disposition       ED Disposition   Discharge    Condition   Stable    Comment   --               PATIENT CONNECTION - [facility] [address]  [phone]  Schedule an appointment as soon as possible for a visit in 3 days      [facility] SANJAY GAUTHIER  [address] [address]    As needed, If symptoms worsen         Medication List        New Prescriptions      baclofen 10 MG tablet  Commonly known as: LIORESAL  Take 1 tablet by mouth 3 (Three) Times a Day As Needed for Muscle Spasms.               Where to Get Your Medications        These medications were sent to [place] [address] [facility] - [place], [place] - [address] [facility] [address] - [phone]  - [phone]   [address] [address], [facility] [address]      Phone: [phone]   baclofen 10 MG tablet

## 2023-08-31 ENCOUNTER — HOSPITAL ENCOUNTER (EMERGENCY)
Facility: HOSPITAL | Age: 33
Discharge: HOME | End: 2023-09-01
Attending: EMERGENCY MEDICINE
Payer: COMMERCIAL

## 2023-08-31 DIAGNOSIS — R42 DIZZY: Primary | ICD-10-CM

## 2023-08-31 LAB
B-HCG UR QL: NEGATIVE
BILIRUB UR QL STRIP: NEGATIVE
CLARITY UR: CLEAR
COLOR UR: NORMAL
GLUCOSE UR STRIP-MCNC: NEGATIVE MG/DL
HGB UR QL STRIP.AUTO: NEGATIVE
KETONES UR QL STRIP: NEGATIVE
LEUKOCYTE ESTERASE UR QL STRIP.AUTO: NEGATIVE
NITRITE UR QL STRIP: NEGATIVE
PH UR STRIP.AUTO: 7 [PH] (ref 5–8)
PROT UR QL STRIP: NEGATIVE
SP GR UR STRIP: 1.01 (ref 1–1.03)
UROBILINOGEN UR QL STRIP: NORMAL

## 2023-08-31 PROCEDURE — 99283 EMERGENCY DEPT VISIT LOW MDM: CPT

## 2023-08-31 PROCEDURE — 93005 ELECTROCARDIOGRAM TRACING: CPT | Performed by: EMERGENCY MEDICINE

## 2023-08-31 PROCEDURE — 81025 URINE PREGNANCY TEST: CPT | Performed by: EMERGENCY MEDICINE

## 2023-08-31 PROCEDURE — 81003 URINALYSIS AUTO W/O SCOPE: CPT | Performed by: EMERGENCY MEDICINE

## 2023-08-31 RX ORDER — SODIUM CHLORIDE 0.9 % (FLUSH) 0.9 %
10 SYRINGE (ML) INJECTION AS NEEDED
Status: DISCONTINUED | OUTPATIENT
Start: 2023-08-31 | End: 2023-09-01 | Stop reason: HOSPADM

## 2023-08-31 NOTE — Clinical Note
[facility]  [address]  [facility] [address]    Kristen Mota was seen and treated in our emergency department on 8/31/2023.  She may return to work on 09/04/2023.         Thank you for choosing [facility].    Jose Luis Merino MD

## 2023-09-01 VITALS
HEIGHT: 66 IN | SYSTOLIC BLOOD PRESSURE: 117 MMHG | TEMPERATURE: 98.1 F | BODY MASS INDEX: 26.22 KG/M2 | RESPIRATION RATE: 18 BRPM | OXYGEN SATURATION: 100 % | WEIGHT: 163.14 LBS | HEART RATE: 66 BPM | DIASTOLIC BLOOD PRESSURE: 91 MMHG

## 2023-09-01 LAB
ALBUMIN SERPL-MCNC: 4 G/DL (ref 3.5–5.2)
ALBUMIN/GLOB SERPL: 1.2 G/DL
ALP SERPL-CCNC: 107 U/L (ref 39–117)
ALT SERPL W P-5'-P-CCNC: 14 U/L (ref 1–33)
ANION GAP SERPL CALCULATED.3IONS-SCNC: 10.3 MMOL/L (ref 5–15)
AST SERPL-CCNC: 17 U/L (ref 1–32)
BASOPHILS # BLD AUTO: 0.01 10*3/MM3 (ref 0–0.2)
BASOPHILS NFR BLD AUTO: 0.2 % (ref 0–1.5)
BILIRUB SERPL-MCNC: 0.4 MG/DL (ref 0–1.2)
BUN SERPL-MCNC: 8 MG/DL (ref 6–20)
BUN/CREAT SERPL: 12.1 (ref 7–25)
CALCIUM SPEC-SCNC: 9 MG/DL (ref 8.6–10.5)
CHLORIDE SERPL-SCNC: 103 MMOL/L (ref 98–107)
CO2 SERPL-SCNC: 22.7 MMOL/L (ref 22–29)
CREAT SERPL-MCNC: 0.66 MG/DL (ref 0.57–1)
DEPRECATED RDW RBC AUTO: 41 FL (ref 37–54)
EGFRCR SERPLBLD CKD-EPI 2021: 119.7 ML/MIN/1.73
EOSINOPHIL # BLD AUTO: 0.1 10*3/MM3 (ref 0–0.4)
EOSINOPHIL NFR BLD AUTO: 1.7 % (ref 0.3–6.2)
ERYTHROCYTE [DISTWIDTH] IN BLOOD BY AUTOMATED COUNT: 13 % (ref 12.3–15.4)
GLOBULIN UR ELPH-MCNC: 3.4 GM/DL
GLUCOSE SERPL-MCNC: 85 MG/DL (ref 65–99)
HCT VFR BLD AUTO: 40.6 % (ref 34–46.6)
HGB BLD-MCNC: 12.7 G/DL (ref 12–15.9)
IMM GRANULOCYTES # BLD AUTO: 0.01 10*3/MM3 (ref 0–0.05)
IMM GRANULOCYTES NFR BLD AUTO: 0.2 % (ref 0–0.5)
LYMPHOCYTES # BLD AUTO: 2.53 10*3/MM3 (ref 0.7–3.1)
LYMPHOCYTES NFR BLD AUTO: 43.5 % (ref 19.6–45.3)
MAGNESIUM SERPL-MCNC: 2.3 MG/DL (ref 1.6–2.6)
MCH RBC QN AUTO: 26.6 PG (ref 26.6–33)
MCHC RBC AUTO-ENTMCNC: 31.3 G/DL (ref 31.5–35.7)
MCV RBC AUTO: 85.1 FL (ref 79–97)
MONOCYTES # BLD AUTO: 0.33 10*3/MM3 (ref 0.1–0.9)
MONOCYTES NFR BLD AUTO: 5.7 % (ref 5–12)
NEUTROPHILS NFR BLD AUTO: 2.84 10*3/MM3 (ref 1.7–7)
NEUTROPHILS NFR BLD AUTO: 48.7 % (ref 42.7–76)
PLATELET # BLD AUTO: 267 10*3/MM3 (ref 140–450)
PMV BLD AUTO: 10.2 FL (ref 6–12)
POTASSIUM SERPL-SCNC: 4.5 MMOL/L (ref 3.5–5.2)
PROT SERPL-MCNC: 7.4 G/DL (ref 6–8.5)
QT INTERVAL: 412 MS
QTC INTERVAL: 425 MS
RBC # BLD AUTO: 4.77 10*6/MM3 (ref 3.77–5.28)
SODIUM SERPL-SCNC: 136 MMOL/L (ref 136–145)
WBC NRBC COR # BLD: 5.82 10*3/MM3 (ref 3.4–10.8)

## 2023-09-01 PROCEDURE — 36415 COLL VENOUS BLD VENIPUNCTURE: CPT

## 2023-09-01 PROCEDURE — 85025 COMPLETE CBC W/AUTO DIFF WBC: CPT | Performed by: EMERGENCY MEDICINE

## 2023-09-01 PROCEDURE — 83735 ASSAY OF MAGNESIUM: CPT | Performed by: EMERGENCY MEDICINE

## 2023-09-01 PROCEDURE — 80053 COMPREHEN METABOLIC PANEL: CPT | Performed by: EMERGENCY MEDICINE

## 2023-09-01 RX ORDER — MECLIZINE HYDROCHLORIDE 25 MG/1
25 TABLET ORAL EVERY 6 HOURS PRN
Qty: 20 TABLET | Refills: 0 | Status: SHIPPED | OUTPATIENT
Start: 2023-09-01

## 2023-09-01 NOTE — DISCHARGE INSTRUCTIONS
Tonight your laboratory evaluation is reassuring.  Your blood count is normal.  All of your electrolytes on your chemistries are normal.  Your urinalysis reveals that you are not pregnant and there is no evidence of infection.    There are several causes of dizziness.  At this time you look very safe.  I did send in a prescription for a medication called Antivert.  This is a medication that we utilize for the dizziness.  Please take as directed    I did give you a referral to the [facility] connection line.  If you will call this line Monday through Friday they can help connect you with a primary care physician if you need one.    I did also give you a work note until Monday to use at your discretion.  I would like you to rest and drink plenty of fluids    Return anytime for increasing symptoms or other difficulties    Please read all of the instructions in this handout.  If you receive prescriptions please fill them and take them as directed.  Please call your primary care physician for follow-up appointment in the next 5 to 7 days.  If you do not have a physician you may call the Patient Connection referral line at [phone].    You may return to the emergency department at any time for any concerns such as worsening symptoms.  If you received a work or school note it will be printed at the back of this packet.

## 2023-09-01 NOTE — FSED PROVIDER NOTE
Patient returned call and was updated.  Verbalizes understanding.  Lab orders faxed to [facility].     Subjective   History of Present Illness  Patient is a 33-year-old female.  She presents with a 1 day history of generalized dizziness.  No fever no chills.  No nausea vomiting or diarrhea.  Normal p.o. intake.  She denies any chronic medical problems.  No dysuria no hematuria.  She is not known to be pregnant.  No abdominal pain.  No chest pain or palpitations.  No preceding events    Review of Systems  Constitutional: No fevers, chills, sweats unless otherwise documented in HPI  Eyes: No recent visual problems, eye discharge, eye pain, redness unless otherwise documented in HPI  HEENT: No ear pain, nasal congestion, sore throat, voice changes unless otherwise documented in HPI  Respiratory: No shortness of breath, cough, pain on breathing, sputum production unless otherwise documented in HPI  Cardiovascular: No chest pain, palpitations, syncope, orthopnea unless otherwise documented in HPI  Gastrointestinal: No nausea, vomiting, diarrhea, constipation unless otherwise documented in HPI  Genitourinary: No hematuria, dysuria, incontinence unless otherwise documented in HPI  Endocrine: Negative for excessive thirst, excessive hunger, excessive urination, heat or cold intolerance unless otherwise documented in HPI  Musculoskeletal: No back pain, neck pain, joint pain, muscle pain, decreased range of motion unless otherwise documented in HPI  Integumentary: No rash, pruritus, abrasion, lesions unless otherwise documented in HPI  Neurologic: No weakness, numbness, frequent headaches, tremors unless otherwise documented in HPI  Psychiatric: No anxiety, depression, mood changes, hallucinations unless otherwise documented in HPI        History reviewed. No pertinent past medical history.    No Known Allergies    History reviewed. No pertinent surgical history.    Family History   Problem Relation Age of Onset    Breast cancer Neg Hx     Ovarian cancer Neg Hx     Uterine cancer Neg Hx     Colon cancer Neg Hx     Deep vein  thrombosis Neg Hx     Pulmonary embolism Neg Hx        Social History     Socioeconomic History    Marital status:    Tobacco Use    Smoking status: Never    Smokeless tobacco: Never   Substance and Sexual Activity    Alcohol use: Never    Drug use: Never    Sexual activity: Yes     Partners: Male     Birth control/protection: None           Objective   Physical Exam  Vital signs: Reviewed in nurses notes    General: Patient is awake alert no acute distress    HEENT: Pupils equal round responsive to light.  Extra-ocular movements are intact.  No scleral icterus.  Nasopharynx is clear.  Oropharynx is clear with moist mucous membranes.  No masses noted    Neck:   Supple without lymphadenopathy    Respiratory:   Nonlabored respirations.  Clear to auscultation bilaterally.  Equal breath sounds bilaterally.  No wheezes or stridor noted.    Cardiovascular: Regular rate and rhythm.  No murmur.  Equal pulses in bilateral lower extremities without edema.    Abdomen: Soft, nontender to palpation.  Normal bowel sounds noted.  No masses noted.    Skin:   Warm and dry.  No rashes noted    Neurological examination: Awake alert oriented.  Nonfocal    ECG 12 Lead      Date/Time: 8/31/2023 11:34 PM  Performed by: Jose Luis Merino MD  Authorized by: Jose Luis Merino MD   Interpreted by physician  Comments: Normal sinus rhythm rate 64.  No acute ischemia noted             ED Course  Appropriate labs were obtained.  EKG was obtained and noted to be normal.      Labs Reviewed   CBC WITH AUTO DIFFERENTIAL - Abnormal; Notable for the following components:       Result Value    MCHC 31.3 (*)     All other components within normal limits   PREGNANCY, URINE - Normal   URINALYSIS WITHOUT MICROSCOPIC (NO CULTURE) - Normal   MAGNESIUM - Normal   COMPREHENSIVE METABOLIC PANEL    Narrative:     GFR Normal >60  Chronic Kidney Disease <60  Kidney Failure <15     CBC AND DIFFERENTIAL    Narrative:     The following orders were created  for panel order CBC & Differential.  Procedure                               Abnormality         Status                     ---------                               -----------         ------                     CBC Auto Differential[[id number]]        Abnormal            Final result                 Please view results for these tests on the individual orders.            Labs Reviewed   CBC WITH AUTO DIFFERENTIAL - Abnormal; Notable for the following components:       Result Value    MCHC 31.3 (*)     All other components within normal limits   PREGNANCY, URINE - Normal   URINALYSIS WITHOUT MICROSCOPIC (NO CULTURE) - Normal   MAGNESIUM - Normal   COMPREHENSIVE METABOLIC PANEL    Narrative:     GFR Normal >60  Chronic Kidney Disease <60  Kidney Failure <15     CBC AND DIFFERENTIAL    Narrative:     The following orders were created for panel order CBC & Differential.  Procedure                               Abnormality         Status                     ---------                               -----------         ------                     CBC Auto Differential[[id number]]        Abnormal            Final result                 Please view results for these tests on the individual orders.                             Differential diagnosis: Anemia, pregnancy, urinary tract infection  Medical Decision Making  Amount and/or Complexity of Data Reviewed  Labs: ordered.  ECG/medicine tests: ordered and independent interpretation performed.    Risk  Prescription drug management.    Upon discharge patient noted to be very stable.  Appropriate treatment plan and return precautions discussed    Final diagnoses:   Dizzy       ED Disposition  ED Disposition       ED Disposition   Discharge    Condition   Stable    Comment   --               PATIENT CONNECTION - [address] [place] [address]  [phone]  In 1 week           Medication List        New Prescriptions      meclizine 25 MG tablet  Commonly known as:  ANTIVERT  Take 1 tablet by mouth Every 6 (Six) Hours As Needed for Dizziness.               Where to Get Your Medications        These medications were sent to [place] [address] [facility] - [place], [place] - [address] [facility] [address] - [phone]  - [phone]   [address] [address] [address], [facility] [address]      Phone: [phone]   meclizine 25 MG tablet

## 2023-12-01 ENCOUNTER — HOSPITAL ENCOUNTER (OUTPATIENT)
Facility: HOSPITAL | Age: 33
Discharge: HOME | End: 2023-12-01
Attending: EMERGENCY MEDICINE | Admitting: EMERGENCY MEDICINE
Payer: COMMERCIAL

## 2023-12-01 VITALS
BODY MASS INDEX: 26.2 KG/M2 | RESPIRATION RATE: 18 BRPM | HEIGHT: 66 IN | TEMPERATURE: 97.5 F | OXYGEN SATURATION: 100 % | DIASTOLIC BLOOD PRESSURE: 75 MMHG | WEIGHT: 163 LBS | SYSTOLIC BLOOD PRESSURE: 109 MMHG | HEART RATE: 79 BPM

## 2023-12-01 DIAGNOSIS — Z32.01 PREGNANCY TEST POSITIVE: ICD-10-CM

## 2023-12-01 DIAGNOSIS — R11.0 NAUSEA: Primary | ICD-10-CM

## 2023-12-01 LAB
B-HCG UR QL: POSITIVE
BILIRUB UR QL STRIP: NEGATIVE
CLARITY UR: CLEAR
COLOR UR: YELLOW
GLUCOSE UR STRIP-MCNC: NEGATIVE MG/DL
HGB UR QL STRIP.AUTO: NEGATIVE
KETONES UR QL STRIP: NEGATIVE
LEUKOCYTE ESTERASE UR QL STRIP.AUTO: NEGATIVE
NITRITE UR QL STRIP: NEGATIVE
PH UR STRIP.AUTO: 7.5 [PH] (ref 5–8)
PROT UR QL STRIP: NEGATIVE
SP GR UR STRIP: 1.01 (ref 1–1.03)
UROBILINOGEN UR QL STRIP: NORMAL

## 2023-12-01 PROCEDURE — 81003 URINALYSIS AUTO W/O SCOPE: CPT

## 2023-12-01 PROCEDURE — 81025 URINE PREGNANCY TEST: CPT

## 2023-12-01 PROCEDURE — 63710000001 ONDANSETRON ODT 4 MG TABLET DISPERSIBLE: Performed by: EMERGENCY MEDICINE

## 2023-12-01 PROCEDURE — 99213 OFFICE O/P EST LOW 20 MIN: CPT | Performed by: EMERGENCY MEDICINE

## 2023-12-01 PROCEDURE — G0463 HOSPITAL OUTPT CLINIC VISIT: HCPCS | Performed by: EMERGENCY MEDICINE

## 2023-12-01 RX ORDER — PNV NO.95/FERROUS FUM/FOLIC AC 28MG-0.8MG
1 TABLET ORAL DAILY
Qty: 30 TABLET | Refills: 0 | Status: SHIPPED | OUTPATIENT
Start: 2023-12-01

## 2023-12-01 RX ORDER — ONDANSETRON 4 MG/1
4 TABLET, ORALLY DISINTEGRATING ORAL 4 TIMES DAILY PRN
Qty: 12 TABLET | Refills: 0 | Status: SHIPPED | OUTPATIENT
Start: 2023-12-01

## 2023-12-01 RX ORDER — ONDANSETRON 4 MG/1
4 TABLET, ORALLY DISINTEGRATING ORAL ONCE
Status: COMPLETED | OUTPATIENT
Start: 2023-12-01 | End: 2023-12-01

## 2023-12-01 RX ADMIN — ONDANSETRON 4 MG: 4 TABLET, ORALLY DISINTEGRATING ORAL at 12:33

## 2023-12-01 NOTE — DISCHARGE INSTRUCTIONS
Please follow-up with an OB/GYN doctor.  They would be the next best step for you    Come back to an ER if worse. Larger/full service hospitals have more resources than us here.

## 2023-12-01 NOTE — FSED PROVIDER NOTE
Subjective   History of Present Illness  Patient with some nausea and vomiting.  Denies abdominal pain or back pain.  No vaginal bleeding or  issues.  She is late for her menstrual cycle      Review of Systems    History reviewed. No pertinent past medical history.    No Known Allergies    History reviewed. No pertinent surgical history.    Family History   Problem Relation Age of Onset    Breast cancer Neg Hx     Ovarian cancer Neg Hx     Uterine cancer Neg Hx     Colon cancer Neg Hx     Deep vein thrombosis Neg Hx     Pulmonary embolism Neg Hx        Social History     Socioeconomic History    Marital status:    Tobacco Use    Smoking status: Never    Smokeless tobacco: Never   Substance and Sexual Activity    Alcohol use: Never    Drug use: Never    Sexual activity: Yes     Partners: Male     Birth control/protection: None           Objective   Physical Exam  Vitals and nursing note reviewed.   Constitutional:       Appearance: Normal appearance.   HENT:      Head: Normocephalic.      Right Ear: External ear normal.      Left Ear: External ear normal.      Nose: Nose normal.   Eyes:      Conjunctiva/sclera: Conjunctivae normal.   Cardiovascular:      Rate and Rhythm: Normal rate.   Pulmonary:      Effort: Pulmonary effort is normal.   Abdominal:      General: Abdomen is flat. There is no distension.      Palpations: Abdomen is soft.      Tenderness: There is no abdominal tenderness.   Musculoskeletal:      Cervical back: Normal range of motion.   Skin:     Findings: No rash.   Neurological:      Mental Status: She is alert and oriented to person, place, and time.   Psychiatric:         Mood and Affect: Mood normal.         Procedures           ED Course                                           Medical Decision Making  Pregnancy test is positive today.  Urinalysis is negative.  She has no pain or bleeding.  Next it would be great follow-up with OB/GYN team.  Contact provided.  Zofran and multivitamins  home    Problems Addressed:  Nausea: complicated acute illness or injury  Pregnancy test negative: complicated acute illness or injury    Risk  OTC drugs.  Prescription drug management.        Final diagnoses:   Pregnancy test negative   Nausea       ED Disposition  ED Disposition       ED Disposition   Discharge    Condition   Stable    Comment   --               Shruthi Kaye MD  [address] [facility] [address] [place], [place] [address]  [phone]      OB GYN         Medication List        Changed      ondansetron ODT 4 MG disintegrating tablet  Commonly known as: ZOFRAN-ODT  Place 1 tablet on the tongue 4 (Four) Times a Day As Needed for Nausea or Vomiting for up to 12 doses.  What changed: when to take this               Where to Get Your Medications        These medications were sent to [place] [address] [facility] - [place], [place] - [address] [facility] [address] - [phone]  - [phone]   [address] [place] [address], [facility] [address]      Phone: [phone]   ondansetron ODT 4 MG disintegrating tablet  prenatal vitamin 28-0.8 28-0.8 MG tablet tablet

## 2023-12-01 NOTE — Clinical Note
[facility] FSED DISHA  [address]  [facility] [address]    Kristen Mota was seen and treated in our emergency department on 12/1/2023.  She may return to work on 12/04/2023.         Thank you for choosing [facility].    Luis Manuel Spencer MD

## 2024-09-01 ENCOUNTER — HOSPITAL ENCOUNTER (EMERGENCY)
Facility: HOSPITAL | Age: 34
Discharge: HOME | End: 2024-09-02
Attending: EMERGENCY MEDICINE
Payer: COMMERCIAL

## 2024-09-01 DIAGNOSIS — K21.9 GASTROESOPHAGEAL REFLUX DISEASE, UNSPECIFIED WHETHER ESOPHAGITIS PRESENT: Primary | ICD-10-CM

## 2024-09-01 PROCEDURE — 99283 EMERGENCY DEPT VISIT LOW MDM: CPT

## 2024-09-02 VITALS
TEMPERATURE: 97.9 F | WEIGHT: 162.92 LBS | BODY MASS INDEX: 26.18 KG/M2 | SYSTOLIC BLOOD PRESSURE: 129 MMHG | HEART RATE: 87 BPM | HEIGHT: 66 IN | OXYGEN SATURATION: 97 % | DIASTOLIC BLOOD PRESSURE: 94 MMHG | RESPIRATION RATE: 16 BRPM

## 2024-09-02 LAB
B-HCG UR QL: NEGATIVE
BILIRUB UR QL STRIP: NEGATIVE
CLARITY UR: CLEAR
COLOR UR: YELLOW
GLUCOSE UR STRIP-MCNC: NEGATIVE MG/DL
HGB UR QL STRIP.AUTO: NEGATIVE
KETONES UR QL STRIP: NEGATIVE
LEUKOCYTE ESTERASE UR QL STRIP.AUTO: NEGATIVE
NITRITE UR QL STRIP: NEGATIVE
PH UR STRIP.AUTO: 6 [PH] (ref 5–8)
PROT UR QL STRIP: NEGATIVE
SP GR UR STRIP: 1.02 (ref 1–1.03)
STREP A PCR: NOT DETECTED
UROBILINOGEN UR QL STRIP: NORMAL

## 2024-09-02 PROCEDURE — 81003 URINALYSIS AUTO W/O SCOPE: CPT | Performed by: EMERGENCY MEDICINE

## 2024-09-02 PROCEDURE — 81025 URINE PREGNANCY TEST: CPT | Performed by: EMERGENCY MEDICINE

## 2024-09-02 PROCEDURE — 99284 EMERGENCY DEPT VISIT MOD MDM: CPT | Performed by: EMERGENCY MEDICINE

## 2024-09-02 PROCEDURE — 87651 STREP A DNA AMP PROBE: CPT | Performed by: EMERGENCY MEDICINE

## 2024-09-02 RX ORDER — SUCRALFATE ORAL 1 G/10ML
1 SUSPENSION ORAL 3 TIMES DAILY
Qty: 150 ML | Refills: 0 | Status: SHIPPED | OUTPATIENT
Start: 2024-09-02 | End: 2024-09-07

## 2024-09-02 RX ORDER — FAMOTIDINE 20 MG/1
20 TABLET, FILM COATED ORAL 2 TIMES DAILY
Qty: 120 TABLET | Refills: 0 | Status: SHIPPED | OUTPATIENT
Start: 2024-09-02 | End: 2024-11-01

## 2024-09-02 RX ADMIN — ALUMINUM HYDROXIDE, MAGNESIUM HYDROXIDE, DIMETHICONE: 400; 400; 40 SUSPENSION ORAL at 00:46

## 2024-09-02 NOTE — DISCHARGE INSTRUCTIONS
Today your urine sample is normal.  Likewise I did check a urine pregnancy test which was negative.    Your strep screen on your throat tonight is negative.    I am restarting your acid blocker twice daily.  I gave you a 60-day supply.    For the next 5 days am also utilizing a liquid medicine called Carafate 3 times daily for 5 days to try to settle down your symptoms.    I did place a referral to one of our gastroenterology groups.  They will call you and help set up a follow-up appointment in the office.    Please read all of the instructions in this handout.  If you receive prescriptions please fill them and take them as directed.  Please call your primary care physician for follow-up appointment in the next 5 to 7 days.  If you do not have a physician you may call the [facility] referral line at [phone].    You may return to the emergency department at any time for any concerns such as worsening symptoms.  If you received a work or school note it will be printed at the back of this packet.

## 2024-09-02 NOTE — FSED PROVIDER NOTE
EMERGENCY DEPARTMENT ENCOUNTER    Room Number:  02/02  Date seen:  9/2/2024  Time seen: 00:23 EDT  PCP: Provider, No Known  Historian:     Discussed/obtained information from independent historians:     HPI:  The patient is a 34-year-old female.  She has a history of GERD as well as a liver mass felt to be focal nodular hyperplasia.His last MRI was dated 9/19/2023 at [facility] and recommendations were serial follow-up MRIs.  She also has been maintained on Protonix in the past as well as Pepcid for her GERD which has been manifested by reflux of her food into her esophagus in the past.    Patient presents to the emergency department tonight stating that for the last 5 days she has had more reflux feeling that her food is coming up her throat.  No fever no chills.  She reports that she has multiple future appointments when I try to ask her about her follow-up MRI of the abdomen pelvis.      External (non-ED) record review:        Chronic or social conditions impacting care:    ALLERGIES  Patient has no known allergies.    PAST MEDICAL HISTORY  Active Ambulatory Problems     Diagnosis Date Noted    No Active Ambulatory Problems     Resolved Ambulatory Problems     Diagnosis Date Noted    No Resolved Ambulatory Problems     No Additional Past Medical History       PAST SURGICAL HISTORY  History reviewed. No pertinent surgical history.    FAMILY HISTORY  Family History   Problem Relation Age of Onset    Breast cancer Neg Hx     Ovarian cancer Neg Hx     Uterine cancer Neg Hx     Colon cancer Neg Hx     Deep vein thrombosis Neg Hx     Pulmonary embolism Neg Hx        SOCIAL HISTORY  Social History     Socioeconomic History    Marital status:    Tobacco Use    Smoking status: Never    Smokeless tobacco: Never   Substance and Sexual Activity    Alcohol use: Never    Drug use: Never    Sexual activity: Yes     Partners: Male     Birth control/protection: None       REVIEW OF SYSTEMS  Review of  Systems    All systems reviewed and negative except for those discussed in HPI.       PHYSICAL EXAM    I have reviewed the triage vital signs and nursing notes.  Vitals:    09/02/24 0001   BP: 129/94   Pulse: 87   Resp: 16   Temp: 97.9 °F (36.6 °C)   SpO2: 97%     Physical Exam    Vital signs: Reviewed in nurses notes    General: Patient is awake alert no acute distress    HEENT: Cephalic atraumatic.  Nasopharynx is clear.  Oropharynx is slightly erythematous bilaterally.  No abscess noted    Neck:   Supple    Respiratory:   Nonlabored respirations.  Clear to auscultation bilaterally.  Equal breath sounds bilaterally.  No wheezes or stridor noted.    Cardiovascular: Regular rate and rhythm.  No murmur.  Equal pulses in bilateral lower extremities without edema.    Abdomen: Nondistended, nontender to palpation x 4 quadrants.  No epigastric tenderness    Skin:   Warm and dry.  No rashes noted    Neurological examination: Patient is awake alert oriented x4.  Speech is normal.  No facial palsy.  No focal motor or sensory deficits.  LAB RESULTS  Recent Results (from the past 24 hour(s))   Rapid Strep A Screen - Swab, Throat    Collection Time: 09/02/24 12:33 AM    Specimen: Throat; Swab   Result Value Ref Range    STREP A PCR Not Detected Not Detected   Pregnancy, Urine - Urine, Clean Catch    Collection Time: 09/02/24 12:35 AM    Specimen: Urine, Clean Catch   Result Value Ref Range    HCG, Urine QL Negative Negative   Urinalysis With Culture If Indicated - Urine, Clean Catch    Collection Time: 09/02/24 12:36 AM    Specimen: Urine, Clean Catch   Result Value Ref Range    Color, UA Yellow Yellow, Straw    Appearance, UA Clear Clear    pH, UA 6.0 5.0 - 8.0    Specific Gravity, UA 1.020 1.005 - 1.030    Glucose, UA Negative Negative    Ketones, UA Negative Negative    Bilirubin, UA Negative Negative    Blood, UA Negative Negative    Protein, UA Negative Negative    Leuk Esterase, UA Negative Negative    Nitrite, UA  Negative Negative    Urobilinogen, UA 0.2 E.U./dL 0.2 - 1.0 E.U./dL       Ordered the above labs and independently interpreted results.  My findings will be discussed in the ED course or medical decision making section below      PROCEDURES:  Procedures      RADIOLOGY RESULTS  No Radiology Exams Resulted Within Past 24 Hours     Ordered the above noted radiological studies.  Independently interpreted by me.  My findings will be discussed in the medical decision section below.     PROGRESS, DATA ANALYSIS, CONSULTS AND MEDICAL DECISION MAKING    Please note that this section constitutes my independent interpretation of clinical data including lab results, radiology, EKG's.  This constitutes my independent professional opinion regarding differential diagnosis and management of this patient.  It may include any factors such as history from outside sources, review of external records, social determinants of health, management of medications, response to those treatments, and discussions with other providers.       Orders placed during this visit:  Orders Placed This Encounter   Procedures    Rapid Strep A Screen - Swab, Throat    Pregnancy, Urine - Urine, Clean Catch    Urinalysis With Culture If Indicated -    Ambulatory Referral to Gastroenterology       Medications   GI Cocktail (aluminum-magnesium hydroxide-simethicone 400-40 MG/5ML suspension 30 ML) ( Oral Given 9/2/24 0046)          PLAN: Will restart Pepcid twice daily with 5 days of Carafate.  Will also make internal referral to GI to get her reestablished for appropriate follow-up.      Medical Decision Making  Problems Addressed:  Gastroesophageal reflux disease, unspecified whether esophagitis present: complicated acute illness or injury    Risk  Prescription drug management.            DIAGNOSIS  Final diagnoses:   Gastroesophageal reflux disease, unspecified whether esophagitis present          Medication List        Changed      * famotidine 20 MG  tablet  Commonly known as: PEPCID  Take 1 tablet by mouth 2 (Two) Times a Day.  What changed: Another medication with the same name was added. Make sure you understand how and when to take each.     * famotidine 20 MG tablet  Commonly known as: PEPCID  Take 1 tablet by mouth 2 (Two) Times a Day for 60 days.  What changed: You were already taking a medication with the same name, and this prescription was added. Make sure you understand how and when to take each.     * sucralfate 1 g tablet  Commonly known as: CARAFATE  Take 1 tablet by mouth 4 (Four) Times a Day.  What changed: Another medication with the same name was added. Make sure you understand how and when to take each.     * sucralfate 1 GM/10ML suspension  Commonly known as: CARAFATE  Take 10 mL by mouth 3 (Three) Times a Day for 5 days.  What changed: You were already taking a medication with the same name, and this prescription was added. Make sure you understand how and when to take each.           * This list has 4 medication(s) that are the same as other medications prescribed for you. Read the directions carefully, and ask your doctor or other care provider to review them with you.                   Where to Get Your Medications        These medications were sent to [facility] [address] [place] [facility] [address] [facility] [address] - [phone]  - [phone]   [address] [facility] [address], [facility] [address]      Phone: [phone]   famotidine 20 MG tablet  sucralfate 1 GM/10ML suspension         FOLLOW-UP  Patricia Whitt MD  [address] [facility] [address]  [facility] [id number] [phone]    Schedule an appointment as soon as possible for a visit       Provider, No Known  [facility]  [facility] [id number] [phone]              Latest Documented Vital Signs:  As of 01:45 EDT  BP- 129/94 HR- 87 Temp- 97.9 °F (36.6 °C) (Oral) O2 sat- 97%    Appropriate PPE utilized throughout this patient encounter to include mask, if indicated,  per current protocol. Hand hygiene was performed before donning PPE and after removal when leaving the room.    Please note that portions of this were completed with a voice recognition program.     Note Disclaimer: At [facility], we believe that sharing information builds trust and better relationships. You are receiving this note because you are receiving care at [facility] or recently visited. It is possible you will see health information before a provider has talked with you about it. This kind of information can be easy to misunderstand. To help you fully understand what it means for your health, we urge you to discuss this note with your provider.

## 2024-09-22 PROCEDURE — 99284 EMERGENCY DEPT VISIT MOD MDM: CPT

## 2024-09-23 ENCOUNTER — HOSPITAL ENCOUNTER (EMERGENCY)
Facility: HOSPITAL | Age: 34
Discharge: HOME | End: 2024-09-23
Attending: EMERGENCY MEDICINE | Admitting: EMERGENCY MEDICINE
Payer: COMMERCIAL

## 2024-09-23 ENCOUNTER — APPOINTMENT (OUTPATIENT)
Dept: ULTRASOUND IMAGING | Facility: HOSPITAL | Age: 34
End: 2024-09-23
Payer: COMMERCIAL

## 2024-09-23 VITALS
HEIGHT: 66 IN | HEART RATE: 94 BPM | RESPIRATION RATE: 15 BRPM | SYSTOLIC BLOOD PRESSURE: 129 MMHG | WEIGHT: 162.92 LBS | BODY MASS INDEX: 26.18 KG/M2 | DIASTOLIC BLOOD PRESSURE: 92 MMHG | OXYGEN SATURATION: 99 % | TEMPERATURE: 98.8 F

## 2024-09-23 DIAGNOSIS — R10.9 ABDOMINAL DISCOMFORT: Primary | ICD-10-CM

## 2024-09-23 DIAGNOSIS — R16.0 LIVER MASS: ICD-10-CM

## 2024-09-23 LAB
ALBUMIN SERPL-MCNC: 4.2 G/DL (ref 3.5–5.2)
ALBUMIN/GLOB SERPL: 1.3 G/DL
ALP SERPL-CCNC: 126 U/L (ref 39–117)
ALT SERPL W P-5'-P-CCNC: 26 U/L (ref 1–33)
ANION GAP SERPL CALCULATED.3IONS-SCNC: 9 MMOL/L (ref 5–15)
AST SERPL-CCNC: 20 U/L (ref 1–32)
B-HCG UR QL: NEGATIVE
BACTERIA UR QL AUTO: ABNORMAL /HPF
BASOPHILS # BLD AUTO: 0.01 10*3/MM3 (ref 0–0.2)
BASOPHILS NFR BLD AUTO: 0.1 % (ref 0–1.5)
BILIRUB SERPL-MCNC: 0.3 MG/DL (ref 0–1.2)
BILIRUB UR QL STRIP: NEGATIVE
BUN SERPL-MCNC: 11 MG/DL (ref 6–20)
BUN/CREAT SERPL: 16.7 (ref 7–25)
CALCIUM SPEC-SCNC: 9.3 MG/DL (ref 8.6–10.5)
CHLORIDE SERPL-SCNC: 101 MMOL/L (ref 98–107)
CLARITY UR: CLEAR
CO2 SERPL-SCNC: 28 MMOL/L (ref 22–29)
COLOR UR: YELLOW
CREAT SERPL-MCNC: 0.66 MG/DL (ref 0.57–1)
DEPRECATED RDW RBC AUTO: 41 FL (ref 37–54)
EGFRCR SERPLBLD CKD-EPI 2021: 119 ML/MIN/1.73
EOSINOPHIL # BLD AUTO: 0.12 10*3/MM3 (ref 0–0.4)
EOSINOPHIL NFR BLD AUTO: 1.3 % (ref 0.3–6.2)
ERYTHROCYTE [DISTWIDTH] IN BLOOD BY AUTOMATED COUNT: 13 % (ref 12.3–15.4)
GLOBULIN UR ELPH-MCNC: 3.2 GM/DL
GLUCOSE SERPL-MCNC: 119 MG/DL (ref 65–99)
GLUCOSE UR STRIP-MCNC: NEGATIVE MG/DL
HCT VFR BLD AUTO: 40.3 % (ref 34–46.6)
HGB BLD-MCNC: 13.2 G/DL (ref 12–15.9)
HGB UR QL STRIP.AUTO: NEGATIVE
HYALINE CASTS UR QL AUTO: ABNORMAL /LPF
IMM GRANULOCYTES # BLD AUTO: 0.03 10*3/MM3 (ref 0–0.05)
IMM GRANULOCYTES NFR BLD AUTO: 0.3 % (ref 0–0.5)
KETONES UR QL STRIP: NEGATIVE
LEUKOCYTE ESTERASE UR QL STRIP.AUTO: ABNORMAL
LIPASE SERPL-CCNC: 33 U/L (ref 13–60)
LYMPHOCYTES # BLD AUTO: 3.66 10*3/MM3 (ref 0.7–3.1)
LYMPHOCYTES NFR BLD AUTO: 38.8 % (ref 19.6–45.3)
MCH RBC QN AUTO: 28.1 PG (ref 26.6–33)
MCHC RBC AUTO-ENTMCNC: 32.8 G/DL (ref 31.5–35.7)
MCV RBC AUTO: 85.7 FL (ref 79–97)
MONOCYTES # BLD AUTO: 0.49 10*3/MM3 (ref 0.1–0.9)
MONOCYTES NFR BLD AUTO: 5.2 % (ref 5–12)
NEUTROPHILS NFR BLD AUTO: 5.13 10*3/MM3 (ref 1.7–7)
NEUTROPHILS NFR BLD AUTO: 54.3 % (ref 42.7–76)
NITRITE UR QL STRIP: NEGATIVE
NRBC BLD AUTO-RTO: 0 /100 WBC (ref 0–0.2)
PH UR STRIP.AUTO: 7 [PH] (ref 5–8)
PLATELET # BLD AUTO: 391 10*3/MM3 (ref 140–450)
PMV BLD AUTO: 9.3 FL (ref 6–12)
POTASSIUM SERPL-SCNC: 3.5 MMOL/L (ref 3.5–5.2)
PROT SERPL-MCNC: 7.4 G/DL (ref 6–8.5)
PROT UR QL STRIP: NEGATIVE
RBC # BLD AUTO: 4.7 10*6/MM3 (ref 3.77–5.28)
RBC # UR STRIP: ABNORMAL /HPF
REF LAB TEST METHOD: ABNORMAL
SODIUM SERPL-SCNC: 138 MMOL/L (ref 136–145)
SP GR UR STRIP: 1.01 (ref 1–1.03)
SQUAMOUS #/AREA URNS HPF: ABNORMAL /HPF
UROBILINOGEN UR QL STRIP: ABNORMAL
WBC # UR STRIP: ABNORMAL /HPF
WBC NRBC COR # BLD AUTO: 9.44 10*3/MM3 (ref 3.4–10.8)

## 2024-09-23 PROCEDURE — 76705 ECHO EXAM OF ABDOMEN: CPT

## 2024-09-23 PROCEDURE — 85025 COMPLETE CBC W/AUTO DIFF WBC: CPT | Performed by: EMERGENCY MEDICINE

## 2024-09-23 PROCEDURE — 83690 ASSAY OF LIPASE: CPT | Performed by: EMERGENCY MEDICINE

## 2024-09-23 PROCEDURE — 81025 URINE PREGNANCY TEST: CPT | Performed by: EMERGENCY MEDICINE

## 2024-09-23 PROCEDURE — 80053 COMPREHEN METABOLIC PANEL: CPT | Performed by: EMERGENCY MEDICINE

## 2024-09-23 PROCEDURE — 81001 URINALYSIS AUTO W/SCOPE: CPT | Performed by: EMERGENCY MEDICINE

## 2024-09-23 RX ORDER — SODIUM CHLORIDE 0.9 % (FLUSH) 0.9 %
10 SYRINGE (ML) INJECTION AS NEEDED
Status: DISCONTINUED | OUTPATIENT
Start: 2024-09-23 | End: 2024-09-23 | Stop reason: HOSPADM

## 2025-03-28 ENCOUNTER — HOSPITAL ENCOUNTER (EMERGENCY)
Facility: HOSPITAL | Age: 35
Discharge: HOME | End: 2025-03-28
Attending: EMERGENCY MEDICINE
Payer: COMMERCIAL

## 2025-03-28 VITALS
DIASTOLIC BLOOD PRESSURE: 88 MMHG | HEIGHT: 66 IN | SYSTOLIC BLOOD PRESSURE: 138 MMHG | RESPIRATION RATE: 16 BRPM | TEMPERATURE: 98.4 F | HEART RATE: 78 BPM | OXYGEN SATURATION: 97 % | BODY MASS INDEX: 28.12 KG/M2 | WEIGHT: 175 LBS

## 2025-03-28 DIAGNOSIS — R42 DIZZINESS: Primary | ICD-10-CM

## 2025-03-28 DIAGNOSIS — M25.571 RIGHT ANKLE PAIN, UNSPECIFIED CHRONICITY: ICD-10-CM

## 2025-03-28 DIAGNOSIS — M54.2 NECK PAIN ON RIGHT SIDE: ICD-10-CM

## 2025-03-28 LAB
ALBUMIN SERPL-MCNC: 4.1 G/DL (ref 3.5–5.2)
ALBUMIN/GLOB SERPL: 1.2 G/DL
ALP SERPL-CCNC: 124 U/L (ref 39–117)
ALT SERPL W P-5'-P-CCNC: 23 U/L (ref 1–33)
ANION GAP SERPL CALCULATED.3IONS-SCNC: 8.8 MMOL/L (ref 5–15)
AST SERPL-CCNC: 22 U/L (ref 1–32)
BASOPHILS # BLD AUTO: 0.01 10*3/MM3 (ref 0–0.2)
BASOPHILS NFR BLD AUTO: 0.1 % (ref 0–1.5)
BILIRUB SERPL-MCNC: 0.3 MG/DL (ref 0–1.2)
BUN SERPL-MCNC: 11 MG/DL (ref 6–20)
BUN/CREAT SERPL: 14.3 (ref 7–25)
CALCIUM SPEC-SCNC: 9.4 MG/DL (ref 8.6–10.5)
CHLORIDE SERPL-SCNC: 103 MMOL/L (ref 98–107)
CO2 SERPL-SCNC: 25.2 MMOL/L (ref 22–29)
CREAT SERPL-MCNC: 0.77 MG/DL (ref 0.57–1)
DEPRECATED RDW RBC AUTO: 41.4 FL (ref 37–54)
EGFRCR SERPLBLD CKD-EPI 2021: 104 ML/MIN/1.73
EOSINOPHIL # BLD AUTO: 0.12 10*3/MM3 (ref 0–0.4)
EOSINOPHIL NFR BLD AUTO: 1.5 % (ref 0.3–6.2)
ERYTHROCYTE [DISTWIDTH] IN BLOOD BY AUTOMATED COUNT: 13.2 % (ref 12.3–15.4)
GLOBULIN UR ELPH-MCNC: 3.4 GM/DL
GLUCOSE SERPL-MCNC: 95 MG/DL (ref 65–99)
HCT VFR BLD AUTO: 40.6 % (ref 34–46.6)
HGB BLD-MCNC: 12.9 G/DL (ref 12–15.9)
IMM GRANULOCYTES # BLD AUTO: 0 10*3/MM3 (ref 0–0.05)
IMM GRANULOCYTES NFR BLD AUTO: 0 % (ref 0–0.5)
LYMPHOCYTES # BLD AUTO: 2.51 10*3/MM3 (ref 0.7–3.1)
LYMPHOCYTES NFR BLD AUTO: 30.8 % (ref 19.6–45.3)
MCH RBC QN AUTO: 26.9 PG (ref 26.6–33)
MCHC RBC AUTO-ENTMCNC: 31.8 G/DL (ref 31.5–35.7)
MCV RBC AUTO: 84.6 FL (ref 79–97)
MONOCYTES # BLD AUTO: 0.46 10*3/MM3 (ref 0.1–0.9)
MONOCYTES NFR BLD AUTO: 5.7 % (ref 5–12)
NEUTROPHILS NFR BLD AUTO: 5.04 10*3/MM3 (ref 1.7–7)
NEUTROPHILS NFR BLD AUTO: 61.9 % (ref 42.7–76)
PLATELET # BLD AUTO: 382 10*3/MM3 (ref 140–450)
PMV BLD AUTO: 9.4 FL (ref 6–12)
POTASSIUM SERPL-SCNC: 4.3 MMOL/L (ref 3.5–5.2)
PROT SERPL-MCNC: 7.5 G/DL (ref 6–8.5)
RBC # BLD AUTO: 4.8 10*6/MM3 (ref 3.77–5.28)
SODIUM SERPL-SCNC: 137 MMOL/L (ref 136–145)
WBC NRBC COR # BLD AUTO: 8.14 10*3/MM3 (ref 3.4–10.8)

## 2025-03-28 PROCEDURE — 25810000003 SODIUM CHLORIDE 0.9 % SOLUTION

## 2025-03-28 PROCEDURE — 96374 THER/PROPH/DIAG INJ IV PUSH: CPT

## 2025-03-28 PROCEDURE — 99283 EMERGENCY DEPT VISIT LOW MDM: CPT

## 2025-03-28 PROCEDURE — 80053 COMPREHEN METABOLIC PANEL: CPT

## 2025-03-28 PROCEDURE — 85025 COMPLETE CBC W/AUTO DIFF WBC: CPT

## 2025-03-28 PROCEDURE — 25010000002 KETOROLAC TROMETHAMINE PER 15 MG

## 2025-03-28 PROCEDURE — 96361 HYDRATE IV INFUSION ADD-ON: CPT

## 2025-03-28 RX ORDER — MECLIZINE HYDROCHLORIDE 25 MG/1
25 TABLET ORAL ONCE
Status: COMPLETED | OUTPATIENT
Start: 2025-03-28 | End: 2025-03-28

## 2025-03-28 RX ORDER — SODIUM CHLORIDE 0.9 % (FLUSH) 0.9 %
10 SYRINGE (ML) INJECTION AS NEEDED
Status: DISCONTINUED | OUTPATIENT
Start: 2025-03-28 | End: 2025-03-28 | Stop reason: HOSPADM

## 2025-03-28 RX ORDER — IBUPROFEN 800 MG/1
800 TABLET, FILM COATED ORAL
Qty: 30 TABLET | Refills: 0 | Status: SHIPPED | OUTPATIENT
Start: 2025-03-28

## 2025-03-28 RX ORDER — MECLIZINE HYDROCHLORIDE 25 MG/1
25 TABLET ORAL 2 TIMES DAILY
Qty: 20 TABLET | Refills: 0 | Status: SHIPPED | OUTPATIENT
Start: 2025-03-28

## 2025-03-28 RX ORDER — KETOROLAC TROMETHAMINE 30 MG/ML
30 INJECTION, SOLUTION INTRAMUSCULAR; INTRAVENOUS ONCE
Status: COMPLETED | OUTPATIENT
Start: 2025-03-28 | End: 2025-03-28

## 2025-03-28 RX ADMIN — KETOROLAC TROMETHAMINE 30 MG: 30 INJECTION, SOLUTION INTRAMUSCULAR at 19:42

## 2025-03-28 RX ADMIN — MECLIZINE HYDROCHLORIDE 25 MG: 25 TABLET ORAL at 20:33

## 2025-03-28 RX ADMIN — SODIUM CHLORIDE 1000 ML: 0.9 INJECTION, SOLUTION INTRAVENOUS at 20:09

## 2025-03-29 NOTE — FSED PROVIDER NOTE
Subjective   History of Present Illness  Patient is a 35-year-old female presents to the emergency department for dizziness and leg pain.  Dizziness comes and goes.  Worse with standing up.  No room spinning sensation.  Has been evaluated for this issue before at [facility] with a normal head CT and lab work.  Patient was prescribed an unknown medication, which she reports did not help her dizziness and made her feel weak.  Patient thinks her dizziness might be related to right ankle and right neck pain, which she has also had on and off. Ankle pain hurts with walking. No known injury.         Review of Systems   Constitutional:  Positive for fatigue. Negative for appetite change, chills, diaphoresis and fever.   HENT:  Negative for congestion and ear pain.    Respiratory:  Negative for shortness of breath.    Cardiovascular:  Negative for chest pain.   Gastrointestinal:  Negative for abdominal pain.   Musculoskeletal:  Positive for arthralgias, myalgias and neck pain. Negative for back pain, gait problem, joint swelling and neck stiffness.   Skin:  Negative for color change, pallor, rash and wound.   Neurological:  Positive for dizziness, weakness and light-headedness. Negative for seizures, syncope, facial asymmetry, speech difficulty, numbness and headaches.       No past medical history on file.    No Known Allergies    No past surgical history on file.    Family History   Problem Relation Age of Onset    Breast cancer Neg Hx     Ovarian cancer Neg Hx     Uterine cancer Neg Hx     Colon cancer Neg Hx     Deep vein thrombosis Neg Hx     Pulmonary embolism Neg Hx        Social History     Socioeconomic History    Marital status:    Tobacco Use    Smoking status: Never    Smokeless tobacco: Never   Substance and Sexual Activity    Alcohol use: Never    Drug use: Never    Sexual activity: Yes     Partners: Male     Birth control/protection: None           Objective   Physical Exam  Constitutional:       General:  She is not in acute distress.     Appearance: Normal appearance. She is normal weight. She is not ill-appearing, toxic-appearing or diaphoretic.   Eyes:      Extraocular Movements: Extraocular movements intact.      Conjunctiva/sclera: Conjunctivae normal.      Pupils: Pupils are equal, round, and reactive to light.   Cardiovascular:      Rate and Rhythm: Normal rate and regular rhythm.   Pulmonary:      Effort: Pulmonary effort is normal. No respiratory distress.   Musculoskeletal:         General: No swelling or tenderness. Normal range of motion.      Cervical back: Normal range of motion. No rigidity or tenderness.      Comments: Normal-appearing ankle and foot.  Normal range of motion.  Normal gait and weightbearing.   Skin:     General: Skin is warm and dry.   Neurological:      General: No focal deficit present.      Mental Status: She is alert and oriented to person, place, and time.      Cranial Nerves: No cranial nerve deficit.      Sensory: No sensory deficit.      Motor: No weakness.      Coordination: Coordination normal.      Gait: Gait normal.   Psychiatric:         Behavior: Behavior normal.         Procedures           ED Course  ED Course as of 03/28/25 2228   Fri Mar 28, 2025   2018 CBC and CMP unremarkable.  Very mildly elevated alk phos.   [AS]      ED Course User Index  [AS] Shruthi Shelton PA-C                                           Medical Decision Making  Patient is a 35-year-old female presents for dizziness and arthralgias.  She overall appears well, no acute stress, nontoxic.  Vital signs WNL.  Exam is unremarkable.  I reviewed the patient's chart.  It appears she has been seen for dizziness several times in the past.  Looks like she was prescribed meclizine over a year ago, but most recently prescribed baclofen when she was seen for dizziness due to some hiccups as well.  Muscle relaxers are known to cause drowsiness, and this is likely the cause of the patient's overall weakness and  fatigue with the medication that she has been describing.  I am recommending she stop this medication.  I will prescribe meclizine instead.  He is recently had a head CT.  I see no indication for repeat today.  I will check lab work and give fluids.  CBC and CMP is largely unremarkable.  Discussed other symptomatic measures for her arthralgias, which I do feel are unrelated to her dizziness presentation today.  Discussed when to return to the emergency department.  Answered all questions.  Patient verbalized understanding and was agreeable to plan and discharge.    My differential diagnosis for dizziness included but is not limited to:  Labyrinthitis, vertigo, concussion, intracranial hemorrhage, stroke, migraine headache, cardiac arrhythmias, acute MI, hypotension, hypertension, hypoxia, inner ear and middle ear infections, anemia, electrolyte abnormalities, dehydration, hyperventilation and anxiety attacks..       Problems Addressed:  Dizziness: complicated acute illness or injury  Neck pain on right side: complicated acute illness or injury  Right ankle pain, unspecified chronicity: complicated acute illness or injury    Amount and/or Complexity of Data Reviewed  Labs: ordered.    Risk  Prescription drug management.        Final diagnoses:   Dizziness   Right ankle pain, unspecified chronicity   Neck pain on right side       ED Disposition  ED Disposition       ED Disposition   Discharge    Condition   Stable    Comment   --               Provider, No Known  [place] [id number] [phone]    Schedule an appointment as soon as possible for a visit            Medication List        Changed      ibuprofen 800 MG tablet  Commonly known as: ADVIL,MOTRIN  Take 1 tablet by mouth 3 (Three) Times a Day With Meals.  What changed:   how much to take  how to take this  when to take this     meclizine 25 MG tablet  Commonly known as: ANTIVERT  Take 1 tablet by mouth 2 (Two) Times a Day.  What  changed:   when to take this  reasons to take this               Where to Get Your Medications        These medications were sent to [place] [address] [facility] - [place], [place] - [address] [facility] [address] - [phone]  - [phone]   [address] [place] [address], [facility] [address]      Phone: [phone]   ibuprofen 800 MG tablet  meclizine 25 MG tablet

## 2025-03-29 NOTE — DISCHARGE INSTRUCTIONS
Follow-up with a primary care provider for ongoing evaluation and management.  Try meclizine for your dizzy symptoms.  Recommend stopping baclofen.  This was likely worsening your symptoms.  Use ibuprofen for ankle and neck pain.  Continue to monitor symptoms, and return to emergency department for worsening symptoms or other medical emergencies.  Refer to the attached instructions for further information.  ----------------------------------------------  Vj gutierrez b'ubuzima mu rwego rwo kugenzura no kugenzura imikoreshereze y'rah ya Bea.  Gerageza meclizine ku bimenyetso byawe bya dizzy. Inavicki elizondo guhagarika baclofen. Ekta steve ibi byatumye ibimenyetso byawe brett claros yaquelin.  [place] mu kuvura brianubabare bw'ijosi n'ijosi.  Ria skeltongenzura ibimenyetso, hanhanyuma usubire ku ishami ry'ubutabazi bwihuse [facility] ubone ibimenyetso bydelonteutliv box ibindi bimenyetso byihutirwa by'ubuvuzi. Mayra lincoln n'racquel marqueso [facility] mariama burleson.

## 2025-04-20 ENCOUNTER — HOSPITAL ENCOUNTER (EMERGENCY)
Facility: HOSPITAL | Age: 35
Discharge: HOME | End: 2025-04-21
Attending: EMERGENCY MEDICINE | Admitting: EMERGENCY MEDICINE
Payer: COMMERCIAL

## 2025-04-20 VITALS
OXYGEN SATURATION: 99 % | TEMPERATURE: 97 F | DIASTOLIC BLOOD PRESSURE: 90 MMHG | HEART RATE: 93 BPM | HEIGHT: 66 IN | BODY MASS INDEX: 28.25 KG/M2 | RESPIRATION RATE: 18 BRPM | SYSTOLIC BLOOD PRESSURE: 153 MMHG

## 2025-04-20 DIAGNOSIS — R42 DIZZINESS: ICD-10-CM

## 2025-04-20 DIAGNOSIS — G44.86 CERVICOGENIC HEADACHE: Primary | ICD-10-CM

## 2025-04-20 PROCEDURE — 93005 ELECTROCARDIOGRAM TRACING: CPT | Performed by: EMERGENCY MEDICINE

## 2025-04-20 PROCEDURE — 25010000002 KETOROLAC TROMETHAMINE PER 15 MG: Performed by: EMERGENCY MEDICINE

## 2025-04-20 PROCEDURE — 93010 ELECTROCARDIOGRAM REPORT: CPT | Performed by: INTERNAL MEDICINE

## 2025-04-20 PROCEDURE — 81025 URINE PREGNANCY TEST: CPT | Performed by: EMERGENCY MEDICINE

## 2025-04-20 PROCEDURE — 96372 THER/PROPH/DIAG INJ SC/IM: CPT

## 2025-04-20 PROCEDURE — 99283 EMERGENCY DEPT VISIT LOW MDM: CPT

## 2025-04-20 RX ORDER — LIDOCAINE 4 G/G
1 PATCH TOPICAL ONCE
Status: DISCONTINUED | OUTPATIENT
Start: 2025-04-20 | End: 2025-04-21 | Stop reason: HOSPADM

## 2025-04-20 RX ORDER — METHOCARBAMOL 750 MG/1
750 TABLET, FILM COATED ORAL ONCE
Status: COMPLETED | OUTPATIENT
Start: 2025-04-20 | End: 2025-04-20

## 2025-04-20 RX ORDER — KETOROLAC TROMETHAMINE 30 MG/ML
30 INJECTION, SOLUTION INTRAMUSCULAR; INTRAVENOUS EVERY 6 HOURS PRN
Status: DISCONTINUED | OUTPATIENT
Start: 2025-04-20 | End: 2025-04-21 | Stop reason: HOSPADM

## 2025-04-20 RX ADMIN — METHOCARBAMOL TABLETS 750 MG: 750 TABLET, COATED ORAL at 23:51

## 2025-04-20 RX ADMIN — LIDOCAINE 1 PATCH: 4 PATCH TOPICAL at 23:51

## 2025-04-20 RX ADMIN — KETOROLAC TROMETHAMINE 30 MG: 30 INJECTION, SOLUTION INTRAMUSCULAR; INTRAVENOUS at 23:51

## 2025-04-21 LAB
B-HCG UR QL: NEGATIVE
QT INTERVAL: 368 MS
QTC INTERVAL: 431 MS

## 2025-04-21 RX ORDER — LIDOCAINE 50 MG/G
1 PATCH TOPICAL EVERY 24 HOURS
Qty: 15 EACH | Refills: 0 | Status: SHIPPED | OUTPATIENT
Start: 2025-04-21

## 2025-04-21 RX ORDER — NAPROXEN 500 MG/1
500 TABLET ORAL 2 TIMES DAILY PRN
Qty: 30 TABLET | Refills: 0 | Status: SHIPPED | OUTPATIENT
Start: 2025-04-21

## 2025-04-21 RX ORDER — METHOCARBAMOL 500 MG/1
500 TABLET, FILM COATED ORAL 3 TIMES DAILY PRN
Qty: 20 TABLET | Refills: 0 | Status: SHIPPED | OUTPATIENT
Start: 2025-04-21

## 2025-04-21 NOTE — ED TRIAGE NOTES
Pt to ED via PV from home.     Pt reports intermittent right shoulder pain that radiates to her head w/ dizziness x 1 week. Pt denies n/v/d, denies numbness/tingling to extremities, denies CP, denies SOA. Pt reports she took ibuprofen this afternoon w/ little relief.   Pt ambulatory to triage.      utilized during triage.

## 2025-04-21 NOTE — ED PROVIDER NOTES
EMERGENCY DEPARTMENT ENCOUNTER    History  Chief Complaint   Patient presents with    Shoulder Pain    Dizziness       History provided by: Patient and Sarah     HPI:  Context: Kristen Mota is a 35 y.o. female with a medical history of dizziness who presents to the ED c/o acute neck pain.  Symptoms started a few days ago.  She notes that symptoms started after she started taking meclizine for dizziness that was prescribed by another emergency department.  She notes pain in her left neck and shoulder.  It radiates up around her right occiput and is causing her to have a headache.  She is not having any difficulty moving the neck.  No fever, cough or congestion.  She feels dizzy, like she might pass out.  She denies any associated chest pain or shortness of breath.  Not having worse headache of life or thunderclap type headache.      Past Medical History:  Active Ambulatory Problems     Diagnosis Date Noted    No Active Ambulatory Problems     Resolved Ambulatory Problems     Diagnosis Date Noted    No Resolved Ambulatory Problems     No Additional Past Medical History       Past Surgical History:  No past surgical history on file.      Family History:  Family History   Problem Relation Age of Onset    Breast cancer Neg Hx     Ovarian cancer Neg Hx     Uterine cancer Neg Hx     Colon cancer Neg Hx     Deep vein thrombosis Neg Hx     Pulmonary embolism Neg Hx          Social History:  Social History     Socioeconomic History    Marital status:    Tobacco Use    Smoking status: Never    Smokeless tobacco: Never   Substance and Sexual Activity    Alcohol use: Never    Drug use: Never    Sexual activity: Yes     Partners: Male     Birth control/protection: None         Allergies:  Patient has no known allergies.        Physical Exam  ED Triage Vitals   Temp Heart Rate Resp BP SpO2   04/20/25 2301 04/20/25 2301 04/20/25 2301 04/20/25 2308 04/20/25 2301   97 °F (36.1 °C) 93 18 145/91  99 %      Temp src Heart Rate Source Patient Position BP Location FiO2 (%)   04/20/25 2301 -- 04/20/25 2308 04/20/25 2308 --   Tympanic  Sitting Left arm      Physical Exam  Constitutional:       Appearance: Normal appearance.   HENT:      Head: Normocephalic and atraumatic.   Eyes:      Pupils: Pupils are equal, round, and reactive to light.   Cardiovascular:      Rate and Rhythm: Normal rate and regular rhythm.      Heart sounds: No murmur heard.  Abdominal:      Tenderness: There is no abdominal tenderness. There is no guarding or rebound.   Musculoskeletal:      Cervical back: Tenderness (Right trapezius, SCM and levator scapula) present.   Skin:     General: Skin is warm.   Neurological:      Mental Status: She is alert and oriented to person, place, and time.      Sensory: No sensory deficit.      Motor: No weakness.             Medications Given in ER:   Medications   ketorolac (TORADOL) injection 30 mg (30 mg Intramuscular Given 4/20/25 2351)   Lidocaine 4 % 1 patch (1 patch Transdermal Medication Applied 4/20/25 2351)   methocarbamol (ROBAXIN) tablet 750 mg (750 mg Oral Given 4/20/25 2351)         Orders Placed:  Orders Placed This Encounter   Procedures    Pregnancy, Urine - Urine, Clean Catch    ECG 12 Lead Other; Dizziness         Outpatient Medication Management:   Current Facility-Administered Medications Ordered in Epic   Medication Dose Route Frequency Provider Last Rate Last Admin    ketorolac (TORADOL) injection 30 mg  30 mg Intramuscular Q6H PRN Marge Sosa MD   30 mg at 04/20/25 2351    Lidocaine 4 % 1 patch  1 patch Transdermal Once Marge Sosa MD   1 patch at 04/20/25 2351     Current Outpatient Medications Ordered in Epic   Medication Sig Dispense Refill    azithromycin (ZITHROMAX) 250 MG tablet 2 po on day one, then 1 po daily x 4 days. 6 tablet 0    baclofen (LIORESAL) 10 MG tablet Take 1 tablet by mouth 3 (Three) Times a Day As Needed for Muscle Spasms. 30 tablet 0    ibuprofen  (ADVIL,MOTRIN) 800 MG tablet Take 1 tablet by mouth 3 (Three) Times a Day With Meals. 30 tablet 0    lidocaine (LIDODERM) 5 % Place 1 patch on the skin as directed by provider Daily. Remove & Discard patch within 12 hours or as directed by MD 15 each 0    meclizine (ANTIVERT) 25 MG tablet Take 1 tablet by mouth 2 (Two) Times a Day. 20 tablet 0    methocarbamol (ROBAXIN) 500 MG tablet Take 1 tablet by mouth 3 (Three) Times a Day As Needed for Muscle Spasms. 20 tablet 0    naproxen (EC NAPROSYN) 500 MG EC tablet Take 1 tablet by mouth 2 (Two) Times a Day As Needed for Mild Pain or Headache. 30 tablet 0    omeprazole (priLOSEC) 20 MG capsule Take 1 capsule by mouth Daily. 30 capsule 0    ondansetron ODT (ZOFRAN-ODT) 4 MG disintegrating tablet Place 1 tablet on the tongue 4 (Four) Times a Day As Needed for Nausea or Vomiting for up to 12 doses. 12 tablet 0    predniSONE (DELTASONE) 20 MG tablet 3 po daily x 2d, then 2 po daily x 2d, then 1 po daily x 2d.  First dose due Thursday 7/13/23 as the first dose was given in emergency dept. 12 tablet 0    Prenatal Vit-Fe Fumarate-FA (prenatal vitamin 28-0.8) 28-0.8 MG tablet tablet Take 1 tablet by mouth Daily. 30 tablet 0    sucralfate (CARAFATE) 1 g tablet Take 1 tablet by mouth 4 (Four) Times a Day. 40 tablet 0           Medical Decision Making:  All labs have been independently interpreted by me.  All radiology studies have been reviewed by me. All EKG's have been independently viewed and interpreted by me.  Discussion below represents my analysis of pertinent findings related to patient's condition, differential diagnosis, treatment plan and final disposition.    Differential Diagnosis includes but not limited to: Cervicogenic headache, arrhythmia, muscle spasm      Review of prior external notes (non-ED) -and- Review of prior external test results outside of this encounter:  Reviewed outside ER visit 2/23/2025, patient was seen for and epigastric discomfort.  She was  evaluated with a CT scan of the head, which did not demonstrate any acute abnormality.  She was treated symptomatically and improved.  She was seen 3/28 for dizziness and arthralgias at [facility] ED.  She had labs including CBC and CMP, which were unremarkable.    Labs Results:  Recent Results (from the past 24 hours)   ECG 12 Lead Other; Dizziness    Collection Time: 04/20/25 11:35 PM   Result Value Ref Range    QT Interval 368 ms    QTC Interval 431 ms   Pregnancy, Urine - Urine, Clean Catch    Collection Time: 04/20/25 11:58 PM    Specimen: Urine, Clean Catch   Result Value Ref Range    HCG, Urine QL Negative Negative     Lab Comments:  My independent interpretation of the above labs: Negative hCG    EKG Interpreted by me: Normal sinus rhythm, normal axis, normal intervals    (Social Determinants of Health): Health Care System (Health Coverage, Provider Availability, Provider Linguistic and Cultural Competency, Quality of Care)    Rationale:  HA not concerning for SAH or meningitis based on history/exam/evaluation.  -Headache not sudden onset  -Not worst Ha ever  -No neurologic symptoms with the headache( no visual changes. no numbness, tingling, weakness.  No confusion.  No difficulty with speech)  -No fever or chills.  Afebrile  -No trauma  -No neck pain  - Full Range of motion of neck with no pain, JOLT test negative. No meningismus.  -Well-appearing on exam  -Normal neurologic exam  - No temporal artery tenderness.    Patient recently had a CT scan of the head a few months ago.  This was reviewed in care everywhere and negative for acute intracranial process.  I do not see an indication to repeat her head CT today.  Given dizziness , she had an EKG here which not demonstrate any cardiogenic cause of syncope like WPW, HOCM, Brugada, A-V dissociation, prolonged QTC.  Previous labs reviewed and no indication for repeating today.  I do suspect this to be a cervicogenic headache causing symptoms.   patient  instructed they must followup with primary care physician/ neurology.  Aware they may need an outpatient MRI.      Testing considered but not performed: CT head as above  Treatment considered but not performed: Parenteral narcotics    Clinical Scores:                                   Progress Notes:  12:32 AM EDT: Patient reports significant symptomatic improvement.  I spoke with the patient about her ED work up, diagnosis and the plan for discharge. I discussed the importance of following up with her PCP. I instructed her to return to the Emergency Room for new or worsening symptoms. All of the pt's questions were answered. The patient is stable at time of discharge.           Complexity of Care:  Admission was considered but after careful review of the patient's presentation, physical examination, diagnostic results, and response to treatment the patient may be safely discharged with outpatient follow-up.    Diagnosis:  Final diagnoses:   Cervicogenic headache   Dizziness       Follow Up:  PATIENT CONNE[facility] - [facility] [id number]  [phone]  Call in 2 days  For reevaluation, To establish care with PCP    [facility] EMERGENCY DEPARTMENT  [address] [facility] [address] [phone]    As needed, If symptoms worsen      Rx:  Current Discharge Medication List        START taking these medications    Details   lidocaine (LIDODERM) 5 % Place 1 patch on the skin as directed by provider Daily. Remove & Discard patch within 12 hours or as directed by MD  Qty: 15 each, Refills: 0      methocarbamol (ROBAXIN) 500 MG tablet Take 1 tablet by mouth 3 (Three) Times a Day As Needed for Muscle Spasms.  Qty: 20 tablet, Refills: 0      naproxen (EC NAPROSYN) 500 MG EC tablet Take 1 tablet by mouth 2 (Two) Times a Day As Needed for Mild Pain or Headache.  Qty: 30 tablet, Refills: 0                 Parts of this note may be an electronic transcription/translation of spoken  language to printed text using the Dragon dictation system        Provider Note Signed by:     Marge Sosa MD  04/21/25 0036

## 2025-07-21 ENCOUNTER — HOSPITAL ENCOUNTER (EMERGENCY)
Facility: HOSPITAL | Age: 35
Discharge: HOME | End: 2025-07-21
Attending: STUDENT IN AN ORGANIZED HEALTH CARE EDUCATION/TRAINING PROGRAM | Admitting: STUDENT IN AN ORGANIZED HEALTH CARE EDUCATION/TRAINING PROGRAM
Payer: COMMERCIAL

## 2025-07-21 VITALS
BODY MASS INDEX: 29.73 KG/M2 | OXYGEN SATURATION: 99 % | TEMPERATURE: 98 F | RESPIRATION RATE: 16 BRPM | SYSTOLIC BLOOD PRESSURE: 104 MMHG | DIASTOLIC BLOOD PRESSURE: 81 MMHG | HEART RATE: 74 BPM | HEIGHT: 66 IN | WEIGHT: 185 LBS

## 2025-07-21 DIAGNOSIS — M62.838 CERVICAL PARASPINAL MUSCLE SPASM: Primary | ICD-10-CM

## 2025-07-21 DIAGNOSIS — H65.191 ACUTE EFFUSION OF RIGHT EAR: ICD-10-CM

## 2025-07-21 LAB
ALBUMIN SERPL-MCNC: 4.4 G/DL (ref 3.5–5.2)
ALBUMIN/GLOB SERPL: 1.3 G/DL
ALP SERPL-CCNC: 113 U/L (ref 39–117)
ALT SERPL W P-5'-P-CCNC: 15 U/L (ref 1–33)
ANION GAP SERPL CALCULATED.3IONS-SCNC: 9 MMOL/L (ref 5–15)
AST SERPL-CCNC: 19 U/L (ref 1–32)
BASOPHILS # BLD AUTO: 0.01 10*3/MM3 (ref 0–0.2)
BASOPHILS NFR BLD AUTO: 0.2 % (ref 0–1.5)
BILIRUB SERPL-MCNC: 0.2 MG/DL (ref 0–1.2)
BUN SERPL-MCNC: 9 MG/DL (ref 6–20)
BUN/CREAT SERPL: 11.7 (ref 7–25)
CALCIUM SPEC-SCNC: 9.3 MG/DL (ref 8.6–10.5)
CHLORIDE SERPL-SCNC: 105 MMOL/L (ref 98–107)
CO2 SERPL-SCNC: 26 MMOL/L (ref 22–29)
CREAT SERPL-MCNC: 0.77 MG/DL (ref 0.57–1)
DEPRECATED RDW RBC AUTO: 40.4 FL (ref 37–54)
EGFRCR SERPLBLD CKD-EPI 2021: 104 ML/MIN/1.73
EOSINOPHIL # BLD AUTO: 0.06 10*3/MM3 (ref 0–0.4)
EOSINOPHIL NFR BLD AUTO: 0.9 % (ref 0.3–6.2)
ERYTHROCYTE [DISTWIDTH] IN BLOOD BY AUTOMATED COUNT: 13 % (ref 12.3–15.4)
GLOBULIN UR ELPH-MCNC: 3.3 GM/DL
GLUCOSE SERPL-MCNC: 102 MG/DL (ref 65–99)
HCT VFR BLD AUTO: 39 % (ref 34–46.6)
HGB BLD-MCNC: 12.7 G/DL (ref 12–15.9)
HOLD SPECIMEN: NORMAL
HOLD SPECIMEN: NORMAL
IMM GRANULOCYTES # BLD AUTO: 0.02 10*3/MM3 (ref 0–0.05)
IMM GRANULOCYTES NFR BLD AUTO: 0.3 % (ref 0–0.5)
LYMPHOCYTES # BLD AUTO: 2.32 10*3/MM3 (ref 0.7–3.1)
LYMPHOCYTES NFR BLD AUTO: 35.3 % (ref 19.6–45.3)
MCH RBC QN AUTO: 28 PG (ref 26.6–33)
MCHC RBC AUTO-ENTMCNC: 32.6 G/DL (ref 31.5–35.7)
MCV RBC AUTO: 86.1 FL (ref 79–97)
MONOCYTES # BLD AUTO: 0.35 10*3/MM3 (ref 0.1–0.9)
MONOCYTES NFR BLD AUTO: 5.3 % (ref 5–12)
NEUTROPHILS NFR BLD AUTO: 3.82 10*3/MM3 (ref 1.7–7)
NEUTROPHILS NFR BLD AUTO: 58 % (ref 42.7–76)
NRBC BLD AUTO-RTO: 0 /100 WBC (ref 0–0.2)
PLATELET # BLD AUTO: 385 10*3/MM3 (ref 140–450)
PMV BLD AUTO: 9.9 FL (ref 6–12)
POTASSIUM SERPL-SCNC: 3.8 MMOL/L (ref 3.5–5.2)
PROT SERPL-MCNC: 7.7 G/DL (ref 6–8.5)
QT INTERVAL: 382 MS
QTC INTERVAL: 436 MS
RBC # BLD AUTO: 4.53 10*6/MM3 (ref 3.77–5.28)
SODIUM SERPL-SCNC: 140 MMOL/L (ref 136–145)
WBC NRBC COR # BLD AUTO: 6.58 10*3/MM3 (ref 3.4–10.8)
WHOLE BLOOD HOLD COAG: NORMAL
WHOLE BLOOD HOLD SPECIMEN: NORMAL

## 2025-07-21 PROCEDURE — 85025 COMPLETE CBC W/AUTO DIFF WBC: CPT

## 2025-07-21 PROCEDURE — 80053 COMPREHEN METABOLIC PANEL: CPT | Performed by: STUDENT IN AN ORGANIZED HEALTH CARE EDUCATION/TRAINING PROGRAM

## 2025-07-21 PROCEDURE — 93005 ELECTROCARDIOGRAM TRACING: CPT | Performed by: STUDENT IN AN ORGANIZED HEALTH CARE EDUCATION/TRAINING PROGRAM

## 2025-07-21 PROCEDURE — 36415 COLL VENOUS BLD VENIPUNCTURE: CPT

## 2025-07-21 PROCEDURE — 99283 EMERGENCY DEPT VISIT LOW MDM: CPT

## 2025-07-21 RX ORDER — SODIUM CHLORIDE 0.9 % (FLUSH) 0.9 %
10 SYRINGE (ML) INJECTION AS NEEDED
Status: DISCONTINUED | OUTPATIENT
Start: 2025-07-21 | End: 2025-07-21 | Stop reason: HOSPADM

## 2025-07-21 RX ORDER — LORATADINE 10 MG/1
10 TABLET ORAL DAILY
Qty: 90 TABLET | Refills: 0 | Status: SHIPPED | OUTPATIENT
Start: 2025-07-21

## 2025-07-21 RX ORDER — METHOCARBAMOL 500 MG/1
500 TABLET, FILM COATED ORAL 3 TIMES DAILY PRN
Qty: 20 TABLET | Refills: 0 | Status: SHIPPED | OUTPATIENT
Start: 2025-07-21

## 2025-07-21 NOTE — ED PROVIDER NOTES
EMERGENCY DEPARTMENT ENCOUNTER  Room Number:  09/09  PCP: Provider, No Known  Independent Historians: Historian: Patient      HPI:  Chief Complaint: had concerns including Headache and Dizziness.     A complete HPI/ROS/PMH/PSH/SH/FH are unobtainable due to: EM_Unobtainable History : None    Chronic or social conditions impacting patient care (Social Determinants of Health): None    Adriana  used    Context: Kristen Mota is a 35 y.o. female with a medical history of shortness of breath, nonalcoholic fatty liver disease, GERD, who presents to the ED c/o acute pain in her right neck and shoulder, pain in her right ear, intermittent dizziness.  She describes the pain in her right neck and shoulder as a pulling like a muscle strain.  In addition to the pressure in her right ear, she notes bilateral eye itching.  She states the dizziness feels like she is spinning but not going to pass out.  It is not associated with standing or moving.  She states the symptoms have been present for several days.  States she has been seen for the pain in her neck before.  She is currently breast-feeding.  No other complaints at this time.      Review of prior external notes (non-ED) -and- Review of prior external test results outside of this encounter: I reviewed [facility] progress note from 1 month ago.  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  Also with nonalcoholic fatty liver disease.  Plan for repeat MRI in 1 year, April 2026.      Prescription drug monitoring program review:         PAST MEDICAL HISTORY  Active Ambulatory Problems     Diagnosis Date Noted    No Active Ambulatory Problems     Resolved Ambulatory Problems     Diagnosis Date Noted    No Resolved Ambulatory Problems     No Additional Past Medical History         PAST SURGICAL HISTORY  No past surgical history on file.      FAMILY HISTORY  Family History   Problem Relation Age of Onset    Breast cancer Neg Hx     Ovarian cancer Neg Hx      Uterine cancer Neg Hx     Colon cancer Neg Hx     Deep vein thrombosis Neg Hx     Pulmonary embolism Neg Hx          SOCIAL HISTORY  Social History     Socioeconomic History    Marital status:    Tobacco Use    Smoking status: Never    Smokeless tobacco: Never   Substance and Sexual Activity    Alcohol use: Never    Drug use: Never    Sexual activity: Yes     Partners: Male     Birth control/protection: None       ALLERGIES  Patient has no known allergies.      PHYSICAL EXAM    I have reviewed the triage vital signs and nursing notes.    ED Triage Vitals   Temp Heart Rate Resp BP SpO2   03/02/25 1448 03/02/25 1448 03/02/25 1448 03/02/25 1450 03/02/25 1448   97.4 °F (36.3 °C) 95 16 141/84 97 %      Temp src Heart Rate Source Patient Position BP Location FiO2 (%)   -- -- -- -- --              Physical Exam  GENERAL: alert, no acute distress  SKIN: Warm, dry  HENT: Normocephalic, atraumatic, left TM clear, right TM with effusion, mild cervical paraspinal muscle tenderness to palpation on right side  EYES: no scleral icterus  CV: regular rhythm, regular rate  RESPIRATORY: normal effort, lungs clear  ABDOMEN: soft, nondistended, nontender  MUSCULOSKELETAL: no deformity  NEURO: alert, moves all extremities, follows commands        LAB RESULTS  Recent Results (from the past 24 hours)   Comprehensive Metabolic Panel    Collection Time: 07/21/25  4:26 PM    Specimen: Arm, Right; Blood   Result Value Ref Range    Glucose 102 (H) 65 - 99 mg/dL    BUN 9.0 6.0 - 20.0 mg/dL    Creatinine 0.77 0.57 - 1.00 mg/dL    Sodium 140 136 - 145 mmol/L    Potassium 3.8 3.5 - 5.2 mmol/L    Chloride 105 98 - 107 mmol/L    CO2 26.0 22.0 - 29.0 mmol/L    Calcium 9.3 8.6 - 10.5 mg/dL    Total Protein 7.7 6.0 - 8.5 g/dL    Albumin 4.4 3.5 - 5.2 g/dL    ALT (SGPT) 15 1 - 33 U/L    AST (SGOT) 19 1 - 32 U/L    Alkaline Phosphatase 113 39 - 117 U/L    Total Bilirubin 0.2 0.0 - 1.2 mg/dL    Globulin 3.3 gm/dL    A/G Ratio 1.3 g/dL     BUN/Creatinine Ratio 11.7 7.0 - 25.0    Anion Gap 9.0 5.0 - 15.0 mmol/L    eGFR 104.0 >60.0 mL/min/1.73   Green Top (Gel)    Collection Time: 07/21/25  4:26 PM   Result Value Ref Range    Extra Tube Hold for add-ons.    Lavender Top    Collection Time: 07/21/25  4:26 PM   Result Value Ref Range    Extra Tube hold for add-on    Gold Top - SST    Collection Time: 07/21/25  4:26 PM   Result Value Ref Range    Extra Tube Hold for add-ons.    Light Blue Top    Collection Time: 07/21/25  4:26 PM   Result Value Ref Range    Extra Tube Hold for add-ons.    CBC Auto Differential    Collection Time: 07/21/25  4:26 PM    Specimen: Arm, Right; Blood   Result Value Ref Range    WBC 6.58 3.40 - 10.80 10*3/mm3    RBC 4.53 3.77 - 5.28 10*6/mm3    Hemoglobin 12.7 12.0 - 15.9 g/dL    Hematocrit 39.0 34.0 - 46.6 %    MCV 86.1 79.0 - 97.0 fL    MCH 28.0 26.6 - 33.0 pg    MCHC 32.6 31.5 - 35.7 g/dL    RDW 13.0 12.3 - 15.4 %    RDW-SD 40.4 37.0 - 54.0 fl    MPV 9.9 6.0 - 12.0 fL    Platelets 385 140 - 450 10*3/mm3    Neutrophil % 58.0 42.7 - 76.0 %    Lymphocyte % 35.3 19.6 - 45.3 %    Monocyte % 5.3 5.0 - 12.0 %    Eosinophil % 0.9 0.3 - 6.2 %    Basophil % 0.2 0.0 - 1.5 %    Immature Grans % 0.3 0.0 - 0.5 %    Neutrophils, Absolute 3.82 1.70 - 7.00 10*3/mm3    Lymphocytes, Absolute 2.32 0.70 - 3.10 10*3/mm3    Monocytes, Absolute 0.35 0.10 - 0.90 10*3/mm3    Eosinophils, Absolute 0.06 0.00 - 0.40 10*3/mm3    Basophils, Absolute 0.01 0.00 - 0.20 10*3/mm3    Immature Grans, Absolute 0.02 0.00 - 0.05 10*3/mm3    nRBC 0.0 0.0 - 0.2 /100 WBC   ECG 12 Lead ED Triage Standing Order; Weak / Dizzy / AMS    Collection Time: 07/21/25  5:04 PM   Result Value Ref Range    QT Interval 382 ms    QTC Interval 436 ms       RADIOLOGY  No Radiology Exams Resulted Within Past 24 Hours      MEDICATIONS GIVEN IN ER  Medications   sodium chloride 0.9 % flush 10 mL (has no administration in time range)         ORDERS PLACED DURING THIS VISIT:  Orders Placed  This Encounter   Procedures    [place] Draw    Comprehensive Metabolic Panel    CBC Auto Differential    NPO Diet NPO Type: Strict NPO    Undress & Gown    Continuous Pulse Oximetry    Vital Signs    Orthostatic Blood Pressure    Oxygen Therapy- Nasal Cannula; Titrate 1-6 LPM Per SpO2; 90 - 95%    POC Glucose Once    ECG 12 Lead ED Triage Standing Order; Weak / Dizzy / AMS    Insert Peripheral IV    Fall Precautions    CBC & Differential    Green Top (Gel)    Lavender Top    Gold Top - SST    Light Blue Top         OUTPATIENT MEDICATION MANAGEMENT:  Current Facility-Administered Medications Ordered in Epic   Medication Dose Route Frequency Provider Last Rate Last Admin    sodium chloride 0.9 % flush 10 mL  10 mL Intravenous PRN Sam Briseno MD         Current Outpatient Medications Ordered in Epic   Medication Sig Dispense Refill    methocarbamol (ROBAXIN) 500 MG tablet Take 1 tablet by mouth 3 (Three) Times a Day As Needed for Muscle Spasms. 20 tablet 0    azithromycin (ZITHROMAX) 250 MG tablet 2 po on day one, then 1 po daily x 4 days. 6 tablet 0    Diclofenac Sodium (VOLTAREN) 1 % gel gel Apply 4 g topically to the appropriate area as directed 4 (Four) Times a Day As Needed (pain). 20 g 0    ibuprofen (ADVIL,MOTRIN) 800 MG tablet Take 1 tablet by mouth 3 (Three) Times a Day With Meals. 30 tablet 0    lidocaine (LIDODERM) 5 % Place 1 patch on the skin as directed by provider Daily. Remove & Discard patch within 12 hours or as directed by MD 15 each 0    loratadine (CLARITIN) 10 MG tablet Take 1 tablet by mouth Daily. 90 tablet 0    meclizine (ANTIVERT) 25 MG tablet Take 1 tablet by mouth 2 (Two) Times a Day. 20 tablet 0    naproxen (EC NAPROSYN) 500 MG EC tablet Take 1 tablet by mouth 2 (Two) Times a Day As Needed for Mild Pain or Headache. 30 tablet 0    omeprazole (priLOSEC) 20 MG capsule Take 1 capsule by mouth Daily. 30 capsule 0    ondansetron ODT (ZOFRAN-ODT) 4 MG disintegrating tablet Place 1 tablet on  the tongue 4 (Four) Times a Day As Needed for Nausea or Vomiting for up to 12 doses. 12 tablet 0    predniSONE (DELTASONE) 20 MG tablet 3 po daily x 2d, then 2 po daily x 2d, then 1 po daily x 2d.  First dose due Thursday 7/13/23 as the first dose was given in emergency dept. 12 tablet 0    Prenatal Vit-Fe Fumarate-FA (prenatal vitamin 28-0.8) 28-0.8 MG tablet tablet Take 1 tablet by mouth Daily. 30 tablet 0    sucralfate (CARAFATE) 1 g tablet Take 1 tablet by mouth 4 (Four) Times a Day. 40 tablet 0         PROCEDURES  Procedures            PROGRESS, DATA ANALYSIS, CONSULTS, AND MEDICAL DECISION MAKING  All labs have been independently interpreted by me.  All radiology studies have been reviewed by me. All EKG's have been independently viewed and interpreted by me.  Discussion below represents my analysis of pertinent findings related to patient's condition, differential diagnosis, treatment plan and final disposition.    Differential diagnosis includes but is not limited to muscle strain, generalized headache, otitis media, middle ear effusion, allergic conjunctivitis, viral illness, vertigo.    Clinical Scores:                                       ED Course as of 07/21/25 1801   Mon Jul 21, 2025   [address] [place]  used for H&P    Patient presents with several complaints.  She notes a muscle pulling and pain in her neck extending to her right shoulder.  She also notes itching in her eyes, pressure in her right ear, intermittent dizziness like she is spinning.  Denies feeling like she is going to pass out.  She states the symptoms have been present for several days.  States she has been seen for the pain in her neck before.  She is currently breast-feeding.  No other complaints at this time.    On exam patient has an effusion in her right ear, no purulence.  Left TM clear.  She does have mild paraspinal muscle tenderness to palpation in her left cervical spine and trapezius muscle    I discussed this  with patient.   I reviewed her laboratory work which was reassuring and EKG is normal.  Plan for prescriptions for Robaxin and Claritin.  PCP follow-up, patient is agreeable  [DN]   1800 ECG 12 Lead ED Triage Standing Order; Weak / Dizzy / AMS  Sinus rhythm, rate 82, normal axis and intervals, no significant ST changes, no STEMI    I compared EKG to prior on 7/21/2025, no change, EKGs interpreted by self [DN]   1801 Creatinine: 0.77 [DN]   1801 WBC: 6.58 [DN]      ED Course User Index  [DN] Sam Briseno MD             AS OF 18:01 EDT VITALS:    BP - 130/96  HR - 68  TEMP - 97.9 °F (36.6 °C) (Oral)  O2 SATS - 98%    COMPLEXITY OF CARE  Admission was considered but after careful review of the patient's presentation, physical examination, diagnostic results, and response to treatment the patient may be safely discharged with outpatient follow-up.      DIAGNOSIS  Final diagnoses:   Cervical paraspinal muscle spasm, right   Acute effusion of right ear         DISPOSITION  ED Disposition       ED Disposition   Discharge    Condition   Stable    Comment   --               New Medications Ordered This Visit   Medications    sodium chloride 0.9 % flush 10 mL    methocarbamol (ROBAXIN) 500 MG tablet     Sig: Take 1 tablet by mouth 3 (Three) Times a Day As Needed for Muscle Spasms.     Dispense:  20 tablet     Refill:  0    Diclofenac Sodium (VOLTAREN) 1 % gel gel     Sig: Apply 4 g topically to the appropriate area as directed 4 (Four) Times a Day As Needed (pain).     Dispense:  20 g     Refill:  0    loratadine (CLARITIN) 10 MG tablet     Sig: Take 1 tablet by mouth Daily.     Dispense:  90 tablet     Refill:  0       Please note that portions of this document were completed with a voice recognition program.    Note Disclaimer: At [facility], we believe that sharing information builds trust and better relationships. You are receiving this note because you recently visited [facility]. It is possible you will see  health information before a provider has talked with you about it. This kind of information can be easy to misunderstand. To help you fully understand what it means for your health, we urge you to discuss this note with your provider.         Sam Briseno MD  07/21/25 4697